# Patient Record
Sex: MALE | Race: WHITE | NOT HISPANIC OR LATINO
[De-identification: names, ages, dates, MRNs, and addresses within clinical notes are randomized per-mention and may not be internally consistent; named-entity substitution may affect disease eponyms.]

---

## 2020-12-28 ENCOUNTER — TRANSCRIPTION ENCOUNTER (OUTPATIENT)
Age: 65
End: 2020-12-28

## 2021-02-17 ENCOUNTER — TRANSCRIPTION ENCOUNTER (OUTPATIENT)
Age: 66
End: 2021-02-17

## 2024-08-03 ENCOUNTER — INPATIENT (INPATIENT)
Facility: HOSPITAL | Age: 69
LOS: 2 days | Discharge: HOME | DRG: 872 | End: 2024-08-06
Attending: STUDENT IN AN ORGANIZED HEALTH CARE EDUCATION/TRAINING PROGRAM | Admitting: HOSPITALIST
Payer: MEDICARE

## 2024-08-03 VITALS
TEMPERATURE: 100 F | WEIGHT: 177.91 LBS | OXYGEN SATURATION: 97 % | SYSTOLIC BLOOD PRESSURE: 123 MMHG | HEIGHT: 71 IN | HEART RATE: 91 BPM | DIASTOLIC BLOOD PRESSURE: 79 MMHG | RESPIRATION RATE: 20 BRPM

## 2024-08-03 DIAGNOSIS — R78.81 BACTEREMIA: ICD-10-CM

## 2024-08-03 DIAGNOSIS — Z98.89 OTHER SPECIFIED POSTPROCEDURAL STATES: Chronic | ICD-10-CM

## 2024-08-03 DIAGNOSIS — N30.00 ACUTE CYSTITIS WITHOUT HEMATURIA: ICD-10-CM

## 2024-08-03 DIAGNOSIS — T79.A29A TRAUMATIC COMPARTMENT SYNDROME OF UNSPECIFIED LOWER EXTREMITY, INITIAL ENCOUNTER: Chronic | ICD-10-CM

## 2024-08-03 DIAGNOSIS — E78.5 HYPERLIPIDEMIA, UNSPECIFIED: ICD-10-CM

## 2024-08-03 DIAGNOSIS — A41.51 SEPSIS DUE TO ESCHERICHIA COLI [E. COLI]: ICD-10-CM

## 2024-08-03 DIAGNOSIS — I10 ESSENTIAL (PRIMARY) HYPERTENSION: ICD-10-CM

## 2024-08-03 DIAGNOSIS — D69.6 THROMBOCYTOPENIA, UNSPECIFIED: ICD-10-CM

## 2024-08-03 DIAGNOSIS — Z29.9 ENCOUNTER FOR PROPHYLACTIC MEASURES, UNSPECIFIED: ICD-10-CM

## 2024-08-03 LAB
ALBUMIN SERPL ELPH-MCNC: 3.7 G/DL — SIGNIFICANT CHANGE UP (ref 3.3–5)
ALP SERPL-CCNC: 49 U/L — SIGNIFICANT CHANGE UP (ref 40–120)
ALT FLD-CCNC: 27 U/L — SIGNIFICANT CHANGE UP (ref 10–45)
ANION GAP SERPL CALC-SCNC: 13 MMOL/L — SIGNIFICANT CHANGE UP (ref 5–17)
APPEARANCE UR: ABNORMAL
APTT BLD: 29.1 SEC — SIGNIFICANT CHANGE UP (ref 24.5–35.6)
AST SERPL-CCNC: 32 U/L — SIGNIFICANT CHANGE UP (ref 10–40)
BACTERIA # UR AUTO: NEGATIVE /HPF — SIGNIFICANT CHANGE UP
BASE EXCESS BLDV CALC-SCNC: 1.7 MMOL/L — SIGNIFICANT CHANGE UP (ref -2–3)
BASOPHILS # BLD AUTO: 0.05 K/UL — SIGNIFICANT CHANGE UP (ref 0–0.2)
BASOPHILS NFR BLD AUTO: 0.3 % — SIGNIFICANT CHANGE UP (ref 0–2)
BILIRUB SERPL-MCNC: 1.1 MG/DL — SIGNIFICANT CHANGE UP (ref 0.2–1.2)
BILIRUB UR-MCNC: NEGATIVE — SIGNIFICANT CHANGE UP
BUN SERPL-MCNC: 22 MG/DL — SIGNIFICANT CHANGE UP (ref 7–23)
CA-I SERPL-SCNC: 1.15 MMOL/L — SIGNIFICANT CHANGE UP (ref 1.15–1.33)
CALCIUM SERPL-MCNC: 8.8 MG/DL — SIGNIFICANT CHANGE UP (ref 8.4–10.5)
CAST: 1 /LPF — SIGNIFICANT CHANGE UP (ref 0–4)
CHLORIDE BLDV-SCNC: 100 MMOL/L — SIGNIFICANT CHANGE UP (ref 96–108)
CHLORIDE SERPL-SCNC: 100 MMOL/L — SIGNIFICANT CHANGE UP (ref 96–108)
CO2 BLDV-SCNC: 29 MMOL/L — HIGH (ref 22–26)
CO2 SERPL-SCNC: 21 MMOL/L — LOW (ref 22–31)
COLOR SPEC: YELLOW — SIGNIFICANT CHANGE UP
CREAT SERPL-MCNC: 0.94 MG/DL — SIGNIFICANT CHANGE UP (ref 0.5–1.3)
DIFF PNL FLD: ABNORMAL
EGFR: 88 ML/MIN/1.73M2 — SIGNIFICANT CHANGE UP
EOSINOPHIL # BLD AUTO: 0.03 K/UL — SIGNIFICANT CHANGE UP (ref 0–0.5)
EOSINOPHIL NFR BLD AUTO: 0.2 % — SIGNIFICANT CHANGE UP (ref 0–6)
GAS PNL BLDV: 131 MMOL/L — LOW (ref 136–145)
GAS PNL BLDV: SIGNIFICANT CHANGE UP
GLUCOSE BLDV-MCNC: 119 MG/DL — HIGH (ref 70–99)
GLUCOSE SERPL-MCNC: 111 MG/DL — HIGH (ref 70–99)
GLUCOSE UR QL: NEGATIVE MG/DL — SIGNIFICANT CHANGE UP
HCO3 BLDV-SCNC: 28 MMOL/L — SIGNIFICANT CHANGE UP (ref 22–29)
HCT VFR BLD CALC: 39.4 % — SIGNIFICANT CHANGE UP (ref 39–50)
HCT VFR BLDA CALC: 40 % — SIGNIFICANT CHANGE UP (ref 39–51)
HGB BLD CALC-MCNC: 13.3 G/DL — SIGNIFICANT CHANGE UP (ref 12.6–17.4)
HGB BLD-MCNC: 13.4 G/DL — SIGNIFICANT CHANGE UP (ref 13–17)
IMM GRANULOCYTES NFR BLD AUTO: 0.7 % — SIGNIFICANT CHANGE UP (ref 0–0.9)
INR BLD: 1.61 RATIO — HIGH (ref 0.85–1.18)
KETONES UR-MCNC: 15 MG/DL
LACTATE BLDV-MCNC: 1.4 MMOL/L — SIGNIFICANT CHANGE UP (ref 0.5–2)
LACTATE SERPL-SCNC: 1 MMOL/L — SIGNIFICANT CHANGE UP (ref 0.5–2)
LEUKOCYTE ESTERASE UR-ACNC: ABNORMAL
LYMPHOCYTES # BLD AUTO: 0.85 K/UL — LOW (ref 1–3.3)
LYMPHOCYTES # BLD AUTO: 5.5 % — LOW (ref 13–44)
MCHC RBC-ENTMCNC: 30 PG — SIGNIFICANT CHANGE UP (ref 27–34)
MCHC RBC-ENTMCNC: 34 GM/DL — SIGNIFICANT CHANGE UP (ref 32–36)
MCV RBC AUTO: 88.1 FL — SIGNIFICANT CHANGE UP (ref 80–100)
MONOCYTES # BLD AUTO: 1.36 K/UL — HIGH (ref 0–0.9)
MONOCYTES NFR BLD AUTO: 8.8 % — SIGNIFICANT CHANGE UP (ref 2–14)
NEUTROPHILS # BLD AUTO: 13 K/UL — HIGH (ref 1.8–7.4)
NEUTROPHILS NFR BLD AUTO: 84.5 % — HIGH (ref 43–77)
NITRITE UR-MCNC: NEGATIVE — SIGNIFICANT CHANGE UP
NRBC # BLD: 0 /100 WBCS — SIGNIFICANT CHANGE UP (ref 0–0)
PCO2 BLDV: 48 MMHG — SIGNIFICANT CHANGE UP (ref 42–55)
PH BLDV: 7.37 — SIGNIFICANT CHANGE UP (ref 7.32–7.43)
PH UR: 5.5 — SIGNIFICANT CHANGE UP (ref 5–8)
PLATELET # BLD AUTO: 90 K/UL — LOW (ref 150–400)
PO2 BLDV: 22 MMHG — LOW (ref 25–45)
POTASSIUM BLDV-SCNC: 4 MMOL/L — SIGNIFICANT CHANGE UP (ref 3.5–5.1)
POTASSIUM SERPL-MCNC: 3.6 MMOL/L — SIGNIFICANT CHANGE UP (ref 3.5–5.3)
POTASSIUM SERPL-SCNC: 3.6 MMOL/L — SIGNIFICANT CHANGE UP (ref 3.5–5.3)
PROT SERPL-MCNC: 6 G/DL — SIGNIFICANT CHANGE UP (ref 6–8.3)
PROT UR-MCNC: 30 MG/DL
PROTHROM AB SERPL-ACNC: 16.7 SEC — HIGH (ref 9.5–13)
RBC # BLD: 4.47 M/UL — SIGNIFICANT CHANGE UP (ref 4.2–5.8)
RBC # FLD: 14 % — SIGNIFICANT CHANGE UP (ref 10.3–14.5)
RBC CASTS # UR COMP ASSIST: 3 /HPF — SIGNIFICANT CHANGE UP (ref 0–4)
REVIEW: SIGNIFICANT CHANGE UP
SAO2 % BLDV: 38.3 % — LOW (ref 67–88)
SODIUM SERPL-SCNC: 134 MMOL/L — LOW (ref 135–145)
SP GR SPEC: 1.02 — SIGNIFICANT CHANGE UP (ref 1–1.03)
SQUAMOUS # UR AUTO: 2 /HPF — SIGNIFICANT CHANGE UP (ref 0–5)
UROBILINOGEN FLD QL: 1 MG/DL — SIGNIFICANT CHANGE UP (ref 0.2–1)
WBC # BLD: 15.4 K/UL — HIGH (ref 3.8–10.5)
WBC # FLD AUTO: 15.4 K/UL — HIGH (ref 3.8–10.5)
WBC UR QL: 196 /HPF — HIGH (ref 0–5)

## 2024-08-03 PROCEDURE — 99223 1ST HOSP IP/OBS HIGH 75: CPT

## 2024-08-03 PROCEDURE — 99291 CRITICAL CARE FIRST HOUR: CPT

## 2024-08-03 PROCEDURE — 74177 CT ABD & PELVIS W/CONTRAST: CPT | Mod: 26

## 2024-08-03 PROCEDURE — 93010 ELECTROCARDIOGRAM REPORT: CPT

## 2024-08-03 RX ORDER — PIPERACILLIN SODIUM, TAZOBACTAM SODIUM 3; .375 G/15ML; G/15ML
3.38 INJECTION, POWDER, LYOPHILIZED, FOR SOLUTION INTRAVENOUS EVERY 8 HOURS
Refills: 0 | Status: DISCONTINUED | OUTPATIENT
Start: 2024-08-03 | End: 2024-08-04

## 2024-08-03 RX ORDER — ACETAMINOPHEN 500 MG
650 TABLET ORAL EVERY 6 HOURS
Refills: 0 | Status: DISCONTINUED | OUTPATIENT
Start: 2024-08-03 | End: 2024-08-06

## 2024-08-03 RX ORDER — MELATONIN 3 MG
3 TABLET ORAL AT BEDTIME
Refills: 0 | Status: DISCONTINUED | OUTPATIENT
Start: 2024-08-03 | End: 2024-08-06

## 2024-08-03 RX ORDER — CANDESARTAN CILEXETIL 4 MG/1
1 TABLET ORAL
Refills: 0 | DISCHARGE

## 2024-08-03 RX ORDER — DEXTROSE MONOHYDRATE, SODIUM CHLORIDE, SODIUM LACTATE, CALCIUM CHLORIDE, MAGNESIUM CHLORIDE 1.5; 538; 448; 18.4; 5.08 G/100ML; MG/100ML; MG/100ML; MG/100ML; MG/100ML
1000 SOLUTION INTRAPERITONEAL ONCE
Refills: 0 | Status: COMPLETED | OUTPATIENT
Start: 2024-08-03 | End: 2024-08-03

## 2024-08-03 RX ORDER — ACETAMINOPHEN 500 MG
1000 TABLET ORAL ONCE
Refills: 0 | Status: COMPLETED | OUTPATIENT
Start: 2024-08-03 | End: 2024-08-03

## 2024-08-03 RX ORDER — LEVOTHYROXINE SODIUM 175 MCG
1 TABLET ORAL
Refills: 0 | DISCHARGE

## 2024-08-03 RX ORDER — ATORVASTATIN CALCIUM 40 MG/1
80 TABLET, FILM COATED ORAL AT BEDTIME
Refills: 0 | Status: DISCONTINUED | OUTPATIENT
Start: 2024-08-03 | End: 2024-08-06

## 2024-08-03 RX ORDER — ROSUVASTATIN CALCIUM 10 MG
1 TABLET ORAL
Refills: 0 | DISCHARGE

## 2024-08-03 RX ORDER — ACETAMINOPHEN 500 MG
650 TABLET ORAL ONCE
Refills: 0 | Status: COMPLETED | OUTPATIENT
Start: 2024-08-03 | End: 2024-08-03

## 2024-08-03 RX ORDER — PIPERACILLIN SODIUM, TAZOBACTAM SODIUM 3; .375 G/15ML; G/15ML
3.38 INJECTION, POWDER, LYOPHILIZED, FOR SOLUTION INTRAVENOUS ONCE
Refills: 0 | Status: COMPLETED | OUTPATIENT
Start: 2024-08-03 | End: 2024-08-03

## 2024-08-03 RX ORDER — LEVOTHYROXINE SODIUM 175 MCG
100 TABLET ORAL DAILY
Refills: 0 | Status: DISCONTINUED | OUTPATIENT
Start: 2024-08-03 | End: 2024-08-06

## 2024-08-03 RX ADMIN — DEXTROSE MONOHYDRATE, SODIUM CHLORIDE, SODIUM LACTATE, CALCIUM CHLORIDE, MAGNESIUM CHLORIDE 1000 MILLILITER(S): 1.5; 538; 448; 18.4; 5.08 SOLUTION INTRAPERITONEAL at 21:14

## 2024-08-03 RX ADMIN — Medication 400 MILLIGRAM(S): at 06:06

## 2024-08-03 RX ADMIN — PIPERACILLIN SODIUM, TAZOBACTAM SODIUM 25 GRAM(S): 3; .375 INJECTION, POWDER, LYOPHILIZED, FOR SOLUTION INTRAVENOUS at 13:02

## 2024-08-03 RX ADMIN — Medication 100 MICROGRAM(S): at 18:33

## 2024-08-03 RX ADMIN — PIPERACILLIN SODIUM, TAZOBACTAM SODIUM 200 GRAM(S): 3; .375 INJECTION, POWDER, LYOPHILIZED, FOR SOLUTION INTRAVENOUS at 05:18

## 2024-08-03 RX ADMIN — DEXTROSE MONOHYDRATE, SODIUM CHLORIDE, SODIUM LACTATE, CALCIUM CHLORIDE, MAGNESIUM CHLORIDE 500 MILLILITER(S): 1.5; 538; 448; 18.4; 5.08 SOLUTION INTRAPERITONEAL at 10:09

## 2024-08-03 RX ADMIN — ATORVASTATIN CALCIUM 80 MILLIGRAM(S): 40 TABLET, FILM COATED ORAL at 22:36

## 2024-08-03 RX ADMIN — PIPERACILLIN SODIUM, TAZOBACTAM SODIUM 25 GRAM(S): 3; .375 INJECTION, POWDER, LYOPHILIZED, FOR SOLUTION INTRAVENOUS at 22:35

## 2024-08-03 RX ADMIN — Medication 650 MILLIGRAM(S): at 18:33

## 2024-08-03 NOTE — ED ADULT NURSE NOTE - NSFALLUNIVINTERV_ED_ALL_ED
Bed/Stretcher in lowest position, wheels locked, appropriate side rails in place/Call bell, personal items and telephone in reach/Instruct patient to call for assistance before getting out of bed/chair/stretcher/Non-slip footwear applied when patient is off stretcher/[place] to call system/Physically safe environment - no spills, clutter or unnecessary equipment/Purposeful proactive rounding/Room/bathroom lighting operational, light cord in reach

## 2024-08-03 NOTE — H&P ADULT - NSHPSOCIALHISTORY_GEN_ALL_CORE
Marital status: , wife was at bedside   Occupation:  of a [email or web address]   Tobacco Use: denies any tobacco use  Alcohol Use: denies etoh intake  Drug use: denies marijuana, cocaine, heroine and other illicit drug use   ETC - very active

## 2024-08-03 NOTE — ED ADULT NURSE NOTE - NSICDXPASTSURGICALHX_GEN_ALL_CORE_FT
PAST SURGICAL HISTORY:  Compartment syndrome of lower extremity     H/O cardiac radiofrequency ablation     H/O knee surgery     H/O shoulder surgery

## 2024-08-03 NOTE — H&P ADULT - NSHPLABSRESULTS_GEN_ALL_CORE
EKG personally reviewed: NSR, HR  75, qtc 412 no st elevation or depression. no pvcs.   CXR personally reviewed: no focal consolidation, no pleff, clear lungs  Labs below are personally reviewed:

## 2024-08-03 NOTE — H&P ADULT - PROBLEM SELECTOR PLAN 2
plt initially 140 and then dropped to 90, unclear origin, but may be related to thrombocytopenia of sepsis  - if continues to drop, will expand workup  - hold chemical ppx, start SCD at this time

## 2024-08-03 NOTE — H&P ADULT - HISTORY OF PRESENT ILLNESS
Patient is a 68 year old male with history of HTN, HLD and Hypothyroid who presents after being called back regarding bacteremia. 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 States that on Thursday 8/1, he woke up drenched in sweat, felt very chilly and started shaking in rigors. States he also felt dizzy at that time. States that when he checked, he had fever of 102F. He was very weak and tired and almost fainted and so family called 911. That day he also had decreased urine output but denies any burning with urination, suprapubic pain, lower back pain. Otherwise has not had any fevers before that day. States that he otherwise did not have any abdominal pain, any right upper quadrant pain.     In the original ED, he had fever of 102.9, elevated lactate, found to have UTI and sent home on oral abx. However he was called back one day later due to positive blood cultures.

## 2024-08-03 NOTE — ED PROVIDER NOTE - ATTENDING CONTRIBUTION TO CARE
67 yo male otherwise in good health recently presented for fever, chills, malaise, decreased urinary output.  had UA c/w UTI, d/c'ed on antibiotics.  blood cultures now resulted positive for e.coli bacteremia.  family @ bedside for collateral.     CBC, CMP sent, leukocytosis noted.  thrombocytopenia of unclear etiology and mild elevation of INR.  lactate wnl.  repeat blood cultures drawn, urinalysis and urine cultures sent.  broad spectrum antibiotics, admit for bacteremia, continued IV antibiotics, ID consult, further inpatient management.

## 2024-08-03 NOTE — H&P ADULT - ASSESSMENT
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 Coli Bacteremia likely from UTI

## 2024-08-03 NOTE — H&P ADULT - NSHPREVIEWOFSYSTEMS_GEN_ALL_CORE
CONSTITUTIONAL/GENERAL: +weakness, fevers and chills  EYES/OPHTHALMOLOGIC: No visual changes, No blurry vision, No vertigo   ENMT: No throat pain, or neck stiffness   RESPIRATORY/THORAX: No cough, wheezing, hemoptysis; No shortness of breath  CARDIOVASCULAR: No chest pain or palpitations  GASTROINTESTINAL: No abdominal or epigastric pain. No nausea, vomiting, or hematemesis; No diarrhea or constipation. No melena or hematochezia.  GENITOURINARY: No dysuria, frequency or hematuria. decreased urinary frequency on thursday but now improved   NEUROLOGICAL: No numbness or weakness  SKIN: No itching, rashes  ENDOCRINOLOGY: no heat intolerance, no cold intolerance, no weight gain, no weight loss  PSYCHIATRIC:  no si/no hi, mood stable, no anxiety  MUSCULOSKELETAL SYSTEM:  no joint pain, no myalgias, no arthralgias, no joint swelling

## 2024-08-03 NOTE — H&P ADULT - PROBLEM SELECTOR PLAN 1
wbc 15, fever to 102.9, and source is UTI and E. Coli bacteremia  - okay to c/w Zosyn, f/u repeat bcx, f/u UCx  - source of e. coli is most likely urine, but given initial elevated billirubin, elevated INR, wonder if there is a component of intrabdominal/liver illness - will order RUQUS and CT A/P  - hold antihypertensives

## 2024-08-03 NOTE — ED PROVIDER NOTE - CLINICAL SUMMARY MEDICAL DECISION MAKING FREE TEXT BOX
68-year-old male history of hypothyroidism, hypertension presenting for abnormal/positive blood cultures the patient was seen yesterday because he was having fevers and diaphoresis in the night.  The patient was diagnosed with a urinary tract infection at that time.  The patient was given antibiotics and sent home.  The patient blood culture however the root E. coli/ gram negative rods and he was told to come back.  The patient states that he is still having fevers but is not having any abdominal pain, nausea, vomiting, headaches or visual changes.  ROS is otherwise negative.    VS. patient borderline febrile orally.  PE.  No acute findings.  Will do septic workup, give empiric antibiotics, and admit the patient for bacteremia.  Patient otherwise has no new complaints. 68-year-old male history of hypothyroidism, hypertension presenting for abnormal/positive blood cultures the patient was seen yesterday because he was having fevers and diaphoresis in the night.  The patient was diagnosed with a urinary tract infection at that time.  The patient was given antibiotics and sent home.  The patient blood culture however the root E. coli/ gram negative rods and he was told to come back.  The patient states that he is still having fevers but is not having any abdominal pain, nausea, vomiting, headaches or visual changes.  ROS is otherwise negative.    VS. patient borderline febrile orally.  PE.  No acute findings.  Will do septic workup, give empiric antibiotics, and admit the patient for bacteremia.  Patient otherwise has no new complaints.    see attending attestation authored by me, Renato Kam MD, for further MDM details.

## 2024-08-03 NOTE — H&P ADULT - PROBLEM SELECTOR PLAN 4
Aliya-Care Coordinator with [facility] called to make sure we received the request below. Aliya is aware message has  Been forwarded to Dr. Gannon. Pt is scheduled for a hospital follow up with Dr. Grace on 6/12. Pt is on a ventilator and   Care coordination wants to make sure that Dr. Grace sees Pt's that are vented.  Aliya can be reached at [phone] today.  Mauricio can be reached 6/05 at *[phone]   holding home candesartan 16mg qD

## 2024-08-03 NOTE — H&P ADULT - PROBLEM SELECTOR PLAN 3
UA + for leuk esterase and nitrates w/ wbcs   -started on Zosyn (8/3- )  -f/u UCx, de-escalate as appropriate

## 2024-08-03 NOTE — ED PROVIDER NOTE - INTERPRETATION
EKG independently reviewed for rate, rhythm, axis, intervals and segments, including QRS morphology, P wave appearance T wave appearance, NM interval, and QT interval.  I find the EKG to be notable as follows: PACs

## 2024-08-03 NOTE — ED ADULT NURSE NOTE - OBJECTIVE STATEMENT
67 y/o M with  Hx HTN, HLD and hypothyroid presenting to ED after being  called to come in by hospital for positive cultures.  pt was recently seen yesterday for fever was DC home on oral antibiotics. Pt states. Upon assessment pt is  A&Ox4 speaking coherently in full sentences. Pt is breathing unlabored and equal BL in no apparent distress, radial pulses are 2+ BL. Denies HA, dizziness, blurry vision. Denies SOB, dyspnea, cough, CP, palpitations. EKG done. On CM, NSR. Denies abd pain, N/V/D/C. Abd soft NT/ND. Denies urinary symptoms. Denies fever, chills. No sick contacts. Skin intact, warm, dry, normal for race.

## 2024-08-03 NOTE — H&P ADULT - NSHPPHYSICALEXAM_GEN_ALL_CORE
GENERAL: NAD, well-developed, somewhat fatigued  HEAD:  Atraumatic, Normocephalic  EYES: EOMI, PERRLA, conjunctiva and sclera clear  NECK: Supple, No JVD  CHEST/LUNG: Clear to auscultation bilaterally; No wheeze  HEART: Regular rate and rhythm; No murmurs, rubs, or gallops  ABDOMEN: Soft, Nontender, Nondistended; Bowel sounds present  EXTREMITIES:  2+ Peripheral Pulses, No clubbing, cyanosis, or edema  PSYCH: AAOx3, appropriate affect  NEUROLOGY: non-focal, [place]dwell  SKIN: No rashes or lesions

## 2024-08-04 LAB
ALBUMIN SERPL ELPH-MCNC: 3.2 G/DL — LOW (ref 3.3–5)
ALP SERPL-CCNC: 54 U/L — SIGNIFICANT CHANGE UP (ref 40–120)
ALT FLD-CCNC: 28 U/L — SIGNIFICANT CHANGE UP (ref 10–45)
ANION GAP SERPL CALC-SCNC: 10 MMOL/L — SIGNIFICANT CHANGE UP (ref 5–17)
AST SERPL-CCNC: 29 U/L — SIGNIFICANT CHANGE UP (ref 10–40)
BASOPHILS # BLD AUTO: 0.03 K/UL — SIGNIFICANT CHANGE UP (ref 0–0.2)
BASOPHILS NFR BLD AUTO: 0.4 % — SIGNIFICANT CHANGE UP (ref 0–2)
BILIRUB SERPL-MCNC: 0.8 MG/DL — SIGNIFICANT CHANGE UP (ref 0.2–1.2)
BUN SERPL-MCNC: 15 MG/DL — SIGNIFICANT CHANGE UP (ref 7–23)
CALCIUM SERPL-MCNC: 8.8 MG/DL — SIGNIFICANT CHANGE UP (ref 8.4–10.5)
CHLORIDE SERPL-SCNC: 102 MMOL/L — SIGNIFICANT CHANGE UP (ref 96–108)
CO2 SERPL-SCNC: 23 MMOL/L — SIGNIFICANT CHANGE UP (ref 22–31)
CREAT SERPL-MCNC: 0.93 MG/DL — SIGNIFICANT CHANGE UP (ref 0.5–1.3)
CULTURE RESULTS: SIGNIFICANT CHANGE UP
EGFR: 89 ML/MIN/1.73M2 — SIGNIFICANT CHANGE UP
EOSINOPHIL # BLD AUTO: 0.1 K/UL — SIGNIFICANT CHANGE UP (ref 0–0.5)
EOSINOPHIL NFR BLD AUTO: 1.4 % — SIGNIFICANT CHANGE UP (ref 0–6)
GLUCOSE BLDC GLUCOMTR-MCNC: 174 MG/DL — HIGH (ref 70–99)
GLUCOSE SERPL-MCNC: 92 MG/DL — SIGNIFICANT CHANGE UP (ref 70–99)
HCT VFR BLD CALC: 39 % — SIGNIFICANT CHANGE UP (ref 39–50)
HGB BLD-MCNC: 13.4 G/DL — SIGNIFICANT CHANGE UP (ref 13–17)
IMM GRANULOCYTES NFR BLD AUTO: 0.3 % — SIGNIFICANT CHANGE UP (ref 0–0.9)
LYMPHOCYTES # BLD AUTO: 0.54 K/UL — LOW (ref 1–3.3)
LYMPHOCYTES # BLD AUTO: 7.4 % — LOW (ref 13–44)
MAGNESIUM SERPL-MCNC: 1.8 MG/DL — SIGNIFICANT CHANGE UP (ref 1.6–2.6)
MCHC RBC-ENTMCNC: 30.4 PG — SIGNIFICANT CHANGE UP (ref 27–34)
MCHC RBC-ENTMCNC: 34.4 GM/DL — SIGNIFICANT CHANGE UP (ref 32–36)
MCV RBC AUTO: 88.4 FL — SIGNIFICANT CHANGE UP (ref 80–100)
MONOCYTES # BLD AUTO: 0.53 K/UL — SIGNIFICANT CHANGE UP (ref 0–0.9)
MONOCYTES NFR BLD AUTO: 7.3 % — SIGNIFICANT CHANGE UP (ref 2–14)
NEUTROPHILS # BLD AUTO: 6.04 K/UL — SIGNIFICANT CHANGE UP (ref 1.8–7.4)
NEUTROPHILS NFR BLD AUTO: 83.2 % — HIGH (ref 43–77)
NRBC # BLD: 0 /100 WBCS — SIGNIFICANT CHANGE UP (ref 0–0)
PHOSPHATE SERPL-MCNC: 1.8 MG/DL — LOW (ref 2.5–4.5)
PLATELET # BLD AUTO: 76 K/UL — LOW (ref 150–400)
POTASSIUM SERPL-MCNC: 4.1 MMOL/L — SIGNIFICANT CHANGE UP (ref 3.5–5.3)
POTASSIUM SERPL-SCNC: 4.1 MMOL/L — SIGNIFICANT CHANGE UP (ref 3.5–5.3)
PROT SERPL-MCNC: 5.8 G/DL — LOW (ref 6–8.3)
RBC # BLD: 4.41 M/UL — SIGNIFICANT CHANGE UP (ref 4.2–5.8)
RBC # FLD: 14 % — SIGNIFICANT CHANGE UP (ref 10.3–14.5)
SODIUM SERPL-SCNC: 135 MMOL/L — SIGNIFICANT CHANGE UP (ref 135–145)
SPECIMEN SOURCE: SIGNIFICANT CHANGE UP
WBC # BLD: 7.26 K/UL — SIGNIFICANT CHANGE UP (ref 3.8–10.5)
WBC # FLD AUTO: 7.26 K/UL — SIGNIFICANT CHANGE UP (ref 3.8–10.5)

## 2024-08-04 PROCEDURE — 99222 1ST HOSP IP/OBS MODERATE 55: CPT | Mod: GC

## 2024-08-04 PROCEDURE — 76705 ECHO EXAM OF ABDOMEN: CPT | Mod: 26

## 2024-08-04 PROCEDURE — 99233 SBSQ HOSP IP/OBS HIGH 50: CPT

## 2024-08-04 RX ADMIN — Medication 650 MILLIGRAM(S): at 17:49

## 2024-08-04 RX ADMIN — Medication 650 MILLIGRAM(S): at 16:22

## 2024-08-04 RX ADMIN — Medication 100 MICROGRAM(S): at 05:39

## 2024-08-04 RX ADMIN — PIPERACILLIN SODIUM, TAZOBACTAM SODIUM 25 GRAM(S): 3; .375 INJECTION, POWDER, LYOPHILIZED, FOR SOLUTION INTRAVENOUS at 13:56

## 2024-08-04 RX ADMIN — PIPERACILLIN SODIUM, TAZOBACTAM SODIUM 25 GRAM(S): 3; .375 INJECTION, POWDER, LYOPHILIZED, FOR SOLUTION INTRAVENOUS at 05:39

## 2024-08-04 RX ADMIN — Medication 100 MILLIGRAM(S): at 15:56

## 2024-08-04 NOTE — PROGRESS NOTE ADULT - TIME BILLING
Reviewing previous chart including notes, imaging, labs  Obtain history and physical exam from patient  Coordination with consultants  Discussing plan of care with patient and family

## 2024-08-04 NOTE — CONSULT NOTE ADULT - ASSESSMENT
69 yo M with PMH of hypothyroidism, and HTN who presented on 8/2 for fever, sweating, chills, fatigue, discharged from ED, called back to hospital for E. Coli bacteremia.    Patient had episode of malaise, fever, chills, shaking, diaphoresis upon awakening.  Tmax 100.1, resolved leukocytosis  Blood cultures with E. coli, pan-sensitive  Repeat blood cultures now without growth after Abx  Urine cultures also with >100K E. Coli with pyuria  CT a/p is unremarkable    Abx:  Zosyn 8/3 -->    #E coli bacteremia 2/2 urinary source    - would dc zosyn and start IV ceftriaxone 1g q24h while inpatient  - when ready for discharge switch to PO ciprofloxacin 500 mg q12h through 8/12    d/w attending    Shar Yates, PGY5  ID Fellow  [employer] Preferred  After 5pm/weekends call [phone]

## 2024-08-04 NOTE — PHYSICAL THERAPY INITIAL EVALUATION ADULT - ADDITIONAL COMMENTS
Pt resides with his wife in an apartment with elevator access. PTA, pt was independent with all mobility/ADLs. Pt was training for an ironman race (swimming, biking, running).

## 2024-08-04 NOTE — CONSULT NOTE ADULT - ATTENDING COMMENTS
68 m with HTN, hypothyroidism,  presented on 8/2 for fever, sweating, chills, fatigue, discharged from ED with cefpodoxime but blood cx came back positive with pan S E-coli so pt was called back   tmax: 100.1    no WBC   urine cx with E-coli as well  CT negative    fever, rigors due to E-coli bacteremia, no urinary symptoms but also in the urine and abd CT negative    * on zosyn since 8/3, afebrile and asymptomatic  * switch to PO cipro 500 bid to complete a 10 day course  * please call with questions    The above assessment and plan was discussed with the primary team    Juliana Mendoza MD  contact on teams  After 5pm and on weekends call [phone]

## 2024-08-04 NOTE — CONSULT NOTE ADULT - SUBJECTIVE AND OBJECTIVE BOX
Patient is a 68y old  Male who presents with a chief complaint of bacteremia (04 Aug 2024 08:15)    HPI:  Patient is a 68 year old male with history of HTN, HLD and Hypothyroid who presents after being called back regarding bacteremia. 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 States that on Thursday 8/1, he woke up drenched in sweat, felt very chilly and started shaking in rigors. States he also felt dizzy at that time. States that when he checked, he had fever of 102F. He was very weak and tired and almost fainted and so family called 911. That day he also had decreased urine output but denies any burning with urination, suprapubic pain, lower back pain. Otherwise has not had any fevers before that day. States that he otherwise did not have any abdominal pain, any right upper quadrant pain.     In the original ED, he had fever of 102.9, elevated lactate, found to have UTI and sent home on oral abx. However he was called back one day later due to positive blood cultures.  (03 Aug 2024 15:33)       REVIEW OF SYSTEMS  Constitutional: No fevers, chills, weight loss or fatigue   Skin: No rash, no phlebitis	  Eyes: No discharge	  ENMT: No sore throat, oral thrush, ulcers or exudate  Respiratory: No cough, no SOB  Cardiovascular:  No chest pain, palpitations or edema   Gastrointestinal: No pain, nausea, vomiting, diarrhea or constipation	  Genitourinary: No dysuria, discharge or flank pain  MSK: No arthralgias or back pain   Neurological: No HA, no weakness, no seizures, no AMS       prior hospital charts reviewed [V]  primary team notes reviewed [V]  other consultant notes reviewed [V]    PAST MEDICAL & SURGICAL HISTORY:  Hypothyroid      Hypertension      SVT (supraventricular tachycardia)      H/O shoulder surgery      Compartment syndrome of lower extremity      H/O knee surgery      H/O cardiac radiofrequency ablation    SOCIAL HISTORY:  - Denied smoking/vaping/alcohol/recreational drug use    FAMILY HISTORY:  No pertinent family history in first degree relatives    Allergies  No Known Allergies    ANTIMICROBIALS:  piperacillin/tazobactam IVPB.. 3.375 every 8 hours      ANTIMICROBIALS (past 90 days):  MEDICATIONS  (STANDING):    piperacillin/tazobactam IVPB.-   25 mL/Hr IV Intermittent (08-03-24 @ 13:02)    piperacillin/tazobactam IVPB..   25 mL/Hr IV Intermittent (08-04-24 @ 05:39)   25 mL/Hr IV Intermittent (08-03-24 @ 22:35)    piperacillin/tazobactam IVPB...   200 mL/Hr IV Intermittent (08-03-24 @ 05:18)        OTHER MEDS:   MEDICATIONS  (STANDING):  acetaminophen     Tablet .. 650 every 6 hours PRN  atorvastatin 80 at bedtime  levothyroxine 100 daily  melatonin 3 at bedtime PRN      VITALS:  Vital Signs Last 24 Hrs  T(F): 99.4 (08-04-24 @ 12:40), Max: 102.9 (08-02-24 @ 05:43)    Vital Signs Last 24 Hrs  HR: 74 (08-04-24 @ 12:40) (62 - 77)  BP: 119/68 (08-04-24 @ 12:40) (105/56 - 148/88)  RR: 18 (08-04-24 @ 12:40)  SpO2: 96% (08-04-24 @ 12:40) (95% - 97%)  Wt(kg): --    EXAM:  General: Patient in no acute distress  HEENT: NCAT, no oral lesions  CV: S1+S2, no m/r/g appreciated   Lungs: No respiratory distress, CTAB  Abd: Soft, nontender, no guarding, no CVA tenderness  Ext: No cyanosis, no edema  Neuro: Alert and oriented  Skin: No rash   IV: No phlebitis      Labs:                        13.4   7.26  )-----------( 76       ( 04 Aug 2024 06:50 )             39.0     08-04    135  |  102  |  15  ----------------------------<  92  4.1   |  23  |  0.93    Ca    8.8      04 Aug 2024 06:50  Phos  1.8     08-04  Mg     1.8     08-04    TPro  5.8<L>  /  Alb  3.2<L>  /  TBili  0.8  /  DBili  x   /  AST  29  /  ALT  28  /  AlkPhos  54  08-04      WBC Trend:  WBC Count: 7.26 (08-04-24 @ 06:50)  WBC Count: 15.40 (08-03-24 @ 05:22)  WBC Count: 5.98 (08-02-24 @ 06:29)      Auto Neutrophil #: 6.04 K/uL (08-04-24 @ 06:50)  Auto Neutrophil #: 13.00 K/uL (08-03-24 @ 05:22)  Auto Neutrophil #: 5.17 K/uL (08-02-24 @ 06:29)  Band Neutrophils %: 8.1 % (08-02-24 @ 06:29)      Creatine Trend:  Creatinine: 0.93 (08-04)  Creatinine: 0.94 (08-03)  Creatinine: 1.11 (08-02)      Liver Biochemical Testing Trend:  Alanine Aminotransferase (ALT/SGPT): 28 (08-04)  Alanine Aminotransferase (ALT/SGPT): 27 (08-03)  Alanine Aminotransferase (ALT/SGPT): 30 (08-02)  Aspartate Aminotransferase (AST/SGOT): 29 (08-04-24 @ 06:50)  Aspartate Aminotransferase (AST/SGOT): 32 (08-03-24 @ 05:22)  Aspartate Aminotransferase (AST/SGOT): 36 (08-02-24 @ 06:29)  Bilirubin Total: 0.8 (08-04)  Bilirubin Total: 1.1 (08-03)  Bilirubin Total: 2.0 (08-02)    Auto Eosinophil %: 1.4 % (08-04-24 @ 06:50)  Auto Eosinophil %: 0.2 % (08-03-24 @ 05:22)  Auto Eosinophil %: 0.0 % (08-02-24 @ 06:29)      Urinalysis Basic - ( 04 Aug 2024 06:50 )    Color: x / Appearance: x / SG: x / pH: x  Gluc: 92 mg/dL / Ketone: x  / Bili: x / Urobili: x   Blood: x / Protein: x / Nitrite: x   Leuk Esterase: x / RBC: x / WBC x   Sq Epi: x / Non Sq Epi: x / Bacteria: x      MICROBIOLOGY:    Culture - Blood (collected 03 Aug 2024 05:12)  Source: .Blood Blood-Peripheral  Preliminary Report:    No growth at 24 hours    Culture - Blood (collected 03 Aug 2024 04:55)  Source: .Blood Blood-Peripheral  Preliminary Report:    No growth at 24 hours    Culture - Urine (collected 02 Aug 2024 12:25)  Source: Clean Catch Clean Catch (Midstream)  Preliminary Report:    >100,000 CFU/ml Escherichia coli    Culture - Blood (collected 02 Aug 2024 05:56)  Source: .Blood Blood-Peripheral  Final Report:    Growth in aerobic and anaerobic bottles: Escherichia coli    See previous culture [id number]    Culture - Blood (collected 02 Aug 2024 05:50)  Source: .Blood Blood-Peripheral  Final Report:    Growth in aerobic and anaerobic bottles: Escherichia coli    Direct identification is available within approximately 3-5    hours either by Blood Panel Multiplexed PCR or Direct    MALDI-TOF. Details: [email or web address]  Organism: Blood Culture PCR  Escherichia coli  Organism: Escherichia coli    Sensitivities:      Method Type: RIANNA      -  Ampicillin: S <=8 These ampicillin results predict results for amoxicillin      -  Ampicillin/Sulbactam: S <=4/2      -  Aztreonam: S <=4      -  Cefazolin: S <=2      -  Cefepime: S <=2      -  Cefoxitin: S <=8      -  Ceftriaxone: S <=1      -  Ciprofloxacin: S <=0.25      -  Ertapenem: S <=0.5      -  Gentamicin: S <=2      -  Imipenem: S <=1      -  Levofloxacin: S <=0.5      -  Meropenem: S <=1      -  Piperacillin/Tazobactam: S <=8      -  Tobramycin: S <=2      -  Trimethoprim/Sulfamethoxazole: S <=0.5/9.5  Organism: Blood Culture PCR    Sensitivities:      Method Type: PCR      -  Escherichia coli: Detec    Blood Gas Venous - Lactate: 1.4 (08-03 @ 15:10)  Lactate, Blood: 1.0 (08-03 @ 05:22)  Lactate, Blood: 1.5 (08-02 @ 09:39)  Blood Gas Venous - Lactate: 2.4 (08-02 @ 05:57)    RADIOLOGY:  imaging below personally reviewed    < from: CT Abdomen and Pelvis w/ IV Cont (08.03.24 @ 19:41) >  FINDINGS:  LOWER CHEST: Mild bibasilar dependent atelectasis.    LIVER: Scattered subcentimeter hypodensities too small to characterize.  BILE DUCTS: Normal caliber.  GALLBLADDER: Within normal limits.  SPLEEN: Within normal limits.  PANCREAS: Within normal limits.  ADRENALS: Within normal limits.  KIDNEYS/URETERS: No hydronephrosis. Right renal cyst and bilateral   subcentimeter hypodensities too small to characterize.    BLADDER: Within normal limits.  REPRODUCTIVE ORGANS: Prostate is enlarged.    BOWEL: No bowel obstruction. Appendix is normal.  PERITONEUM/RETROPERITONEUM: Within normal limits.  VESSELS: Atherosclerotic changes.  LYMPH NODES: No lymphadenopathy.  ABDOMINAL WALL: Small fat-containing right inguinal hernia.  BONES: Degenerative changes. Grade 1 anterolisthesis of L4 on L5.    IMPRESSION:  No evidence of acute abnormality in the abdomen and pelvis.    < end of copied text >   Patient is a 68y old  Male who presents with a chief complaint of bacteremia (04 Aug 2024 08:15)    HPI:  Patient is a 68 year old male with history of HTN, HLD and Hypothyroid who presents after being called back regarding bacteremia. 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 States that on Thursday 8/1, he woke up drenched in sweat, felt very chilly and started shaking in rigors. States he also felt dizzy at that time. States that when he checked, he had fever of 102F. He was very weak and tired and almost fainted and so family called 911. That day he also had decreased urine output but denies any burning with urination, suprapubic pain, lower back pain. Otherwise has not had any fevers before that day. States that he otherwise did not have any abdominal pain, any right upper quadrant pain.     In the original ED, he had fever of 102.9, elevated lactate, found to have UTI and sent home on oral abx. However he was called back one day later due to positive blood cultures.  (03 Aug 2024 15:33)       REVIEW OF SYSTEMS  Constitutional: No fevers, chills, weight loss or fatigue   Skin: No rash, no phlebitis	  Eyes: No discharge	  ENMT: No sore throat, oral thrush, ulcers or exudate  Respiratory: No cough, no SOB  Cardiovascular:  No chest pain, palpitations or edema   Gastrointestinal: No pain, nausea, vomiting, diarrhea or constipation	  Genitourinary: No dysuria, discharge or flank pain  MSK: No arthralgias or back pain   Neurological: No HA, no weakness, no seizures, no AMS   psych: no anxiety    prior hospital charts reviewed [V]  primary team notes reviewed [V]  other consultant notes reviewed [V]    PAST MEDICAL & SURGICAL HISTORY:  Hypothyroid      Hypertension      SVT (supraventricular tachycardia)      H/O shoulder surgery      Compartment syndrome of lower extremity      H/O knee surgery      H/O cardiac radiofrequency ablation    SOCIAL HISTORY:  , lives with wife  no smoking/vaping/alcohol/recreational drug use    FAMILY HISTORY:  No recent febrile illness in family members    Allergies  No Known Allergies    ANTIMICROBIALS:  piperacillin/tazobactam IVPB.. 3.375 every 8 hours      ANTIMICROBIALS (past 90 days):  MEDICATIONS  (STANDING):    piperacillin/tazobactam IVPB.-   25 mL/Hr IV Intermittent (08-03-24 @ 13:02)    piperacillin/tazobactam IVPB..   25 mL/Hr IV Intermittent (08-04-24 @ 05:39)   25 mL/Hr IV Intermittent (08-03-24 @ 22:35)    piperacillin/tazobactam IVPB...   200 mL/Hr IV Intermittent (08-03-24 @ 05:18)        OTHER MEDS:   MEDICATIONS  (STANDING):  acetaminophen     Tablet .. 650 every 6 hours PRN  atorvastatin 80 at bedtime  levothyroxine 100 daily  melatonin 3 at bedtime PRN      VITALS:  Vital Signs Last 24 Hrs  T(F): 99.4 (08-04-24 @ 12:40), Max: 102.9 (08-02-24 @ 05:43)    Vital Signs Last 24 Hrs  HR: 74 (08-04-24 @ 12:40) (62 - 77)  BP: 119/68 (08-04-24 @ 12:40) (105/56 - 148/88)  RR: 18 (08-04-24 @ 12:40)  SpO2: 96% (08-04-24 @ 12:40) (95% - 97%)  Wt(kg): --    EXAM:  General: Patient in no acute distress  HEENT: NCAT, no oral lesions  CV: S1+S2, no m/r/g appreciated   Lungs: No respiratory distress, CTAB  Abd: Soft, nontender, no guarding, no CVA tenderness  Ext: No cyanosis, no edema  Neuro: Alert and oriented  Skin: No rash   IV: No phlebitis      Labs:                        13.4   7.26  )-----------( 76       ( 04 Aug 2024 06:50 )             39.0     08-04    135  |  102  |  15  ----------------------------<  92  4.1   |  23  |  0.93    Ca    8.8      04 Aug 2024 06:50  Phos  1.8     08-04  Mg     1.8     08-04    TPro  5.8<L>  /  Alb  3.2<L>  /  TBili  0.8  /  DBili  x   /  AST  29  /  ALT  28  /  AlkPhos  54  08-04      WBC Trend:  WBC Count: 7.26 (08-04-24 @ 06:50)  WBC Count: 15.40 (08-03-24 @ 05:22)  WBC Count: 5.98 (08-02-24 @ 06:29)      Auto Neutrophil #: 6.04 K/uL (08-04-24 @ 06:50)  Auto Neutrophil #: 13.00 K/uL (08-03-24 @ 05:22)  Auto Neutrophil #: 5.17 K/uL (08-02-24 @ 06:29)  Band Neutrophils %: 8.1 % (08-02-24 @ 06:29)      Creatine Trend:  Creatinine: 0.93 (08-04)  Creatinine: 0.94 (08-03)  Creatinine: 1.11 (08-02)      Liver Biochemical Testing Trend:  Alanine Aminotransferase (ALT/SGPT): 28 (08-04)  Alanine Aminotransferase (ALT/SGPT): 27 (08-03)  Alanine Aminotransferase (ALT/SGPT): 30 (08-02)  Aspartate Aminotransferase (AST/SGOT): 29 (08-04-24 @ 06:50)  Aspartate Aminotransferase (AST/SGOT): 32 (08-03-24 @ 05:22)  Aspartate Aminotransferase (AST/SGOT): 36 (08-02-24 @ 06:29)  Bilirubin Total: 0.8 (08-04)  Bilirubin Total: 1.1 (08-03)  Bilirubin Total: 2.0 (08-02)    Auto Eosinophil %: 1.4 % (08-04-24 @ 06:50)  Auto Eosinophil %: 0.2 % (08-03-24 @ 05:22)  Auto Eosinophil %: 0.0 % (08-02-24 @ 06:29)      Urinalysis Basic - ( 04 Aug 2024 06:50 )    Color: x / Appearance: x / SG: x / pH: x  Gluc: 92 mg/dL / Ketone: x  / Bili: x / Urobili: x   Blood: x / Protein: x / Nitrite: x   Leuk Esterase: x / RBC: x / WBC x   Sq Epi: x / Non Sq Epi: x / Bacteria: x      MICROBIOLOGY:    Culture - Blood (collected 03 Aug 2024 05:12)  Source: .Blood Blood-Peripheral  Preliminary Report:    No growth at 24 hours    Culture - Blood (collected 03 Aug 2024 04:55)  Source: .Blood Blood-Peripheral  Preliminary Report:    No growth at 24 hours    Culture - Urine (collected 02 Aug 2024 12:25)  Source: Clean Catch Clean Catch (Midstream)  Preliminary Report:    >100,000 CFU/ml Escherichia coli    Culture - Blood (collected 02 Aug 2024 05:56)  Source: .Blood Blood-Peripheral  Final Report:    Growth in aerobic and anaerobic bottles: Escherichia coli    See previous culture [id number]    Culture - Blood (collected 02 Aug 2024 05:50)  Source: .Blood Blood-Peripheral  Final Report:    Growth in aerobic and anaerobic bottles: Escherichia coli    Direct identification is available within approximately 3-5    hours either by Blood Panel Multiplexed PCR or Direct    MALDI-TOF. Details: [email or web address]  Organism: Blood Culture PCR  Escherichia coli  Organism: Escherichia coli    Sensitivities:      Method Type: RIANNA      -  Ampicillin: S <=8 These ampicillin results predict results for amoxicillin      -  Ampicillin/Sulbactam: S <=4/2      -  Aztreonam: S <=4      -  Cefazolin: S <=2      -  Cefepime: S <=2      -  Cefoxitin: S <=8      -  Ceftriaxone: S <=1      -  Ciprofloxacin: S <=0.25      -  Ertapenem: S <=0.5      -  Gentamicin: S <=2      -  Imipenem: S <=1      -  Levofloxacin: S <=0.5      -  Meropenem: S <=1      -  Piperacillin/Tazobactam: S <=8      -  Tobramycin: S <=2      -  Trimethoprim/Sulfamethoxazole: S <=0.5/9.5  Organism: Blood Culture PCR    Sensitivities:      Method Type: PCR      -  Escherichia coli: Detec    Blood Gas Venous - Lactate: 1.4 (08-03 @ 15:10)  Lactate, Blood: 1.0 (08-03 @ 05:22)  Lactate, Blood: 1.5 (08-02 @ 09:39)  Blood Gas Venous - Lactate: 2.4 (08-02 @ 05:57)    RADIOLOGY:  imaging below personally reviewed    < from: CT Abdomen and Pelvis w/ IV Cont (08.03.24 @ 19:41) >  FINDINGS:  LOWER CHEST: Mild bibasilar dependent atelectasis.    LIVER: Scattered subcentimeter hypodensities too small to characterize.  BILE DUCTS: Normal caliber.  GALLBLADDER: Within normal limits.  SPLEEN: Within normal limits.  PANCREAS: Within normal limits.  ADRENALS: Within normal limits.  KIDNEYS/URETERS: No hydronephrosis. Right renal cyst and bilateral   subcentimeter hypodensities too small to characterize.    BLADDER: Within normal limits.  REPRODUCTIVE ORGANS: Prostate is enlarged.    BOWEL: No bowel obstruction. Appendix is normal.  PERITONEUM/RETROPERITONEUM: Within normal limits.  VESSELS: Atherosclerotic changes.  LYMPH NODES: No lymphadenopathy.  ABDOMINAL WALL: Small fat-containing right inguinal hernia.  BONES: Degenerative changes. Grade 1 anterolisthesis of L4 on L5.    IMPRESSION:  No evidence of acute abnormality in the abdomen and pelvis.    < end of copied text >

## 2024-08-04 NOTE — PROGRESS NOTE ADULT - SUBJECTIVE AND OBJECTIVE BOX
PROGRESS NOTE:   Authored by María Veronica MD  Internal Medicine Resident Physician, PGY-1      Patient is a 68y old  Male who presents with a chief complaint of bacteremia (03 Aug 2024 15:33)      SUBJECTIVE / OVERNIGHT EVENTS: ***, patient seen and examined at bedside    MEDICATIONS  (STANDING):  atorvastatin 80 milliGRAM(s) Oral at bedtime  levothyroxine 100 MICROGram(s) Oral daily  piperacillin/tazobactam IVPB.. 3.375 Gram(s) IV Intermittent every 8 hours    MEDICATIONS  (PRN):  acetaminophen     Tablet .. 650 milliGRAM(s) Oral every 6 hours PRN Temp greater or equal to 38C (100.4F), Moderate Pain (4 - 6)  melatonin 3 milliGRAM(s) Oral at bedtime PRN Insomnia      CAPILLARY BLOOD GLUCOSE        I&O's Summary      PHYSICAL EXAM:  Vital Signs Last 24 Hrs  T(C): 37.7 (04 Aug 2024 06:45), Max: 37.7 (04 Aug 2024 06:45)  T(F): 99.9 (04 Aug 2024 06:45), Max: 99.9 (04 Aug 2024 06:45)  HR: 64 (04 Aug 2024 06:45) (62 - 77)  BP: 134/74 (04 Aug 2024 06:45) (105/56 - 134/74)  BP(mean): 69 (03 Aug 2024 15:09) (69 - 69)  RR: 18 (04 Aug 2024 06:45) (18 - 18)  SpO2: 97% (04 Aug 2024 06:45) (95% - 98%)    Parameters below as of 04 Aug 2024 06:45  Patient On (Oxygen Delivery Method): room air      CONSTITUTIONAL: Well-groomed, in no apparent distress  RESPIRATORY: Breathing comfortably; no dullness to percussion; lungs CTA without wheeze/rhonchi/rales  CARDIOVASCULAR: +S1S2, RRR, no M/G/R; pedal pulses full and symmetric; no lower extremity edema  GASTROINTESTINAL: No palpable masses or tenderness, +BS throughout, no rebound/guarding; no hepatosplenomegaly; no hernia palpated  SKIN: No rashes or ulcers noted  NEUROLOGIC: A+O x 3, CN II-XII intact; sensation intact in LEs b/l to light touch    LABS:                        13.4   7.26  )-----------( 76       ( 04 Aug 2024 06:50 )             39.0     08-04    135  |  102  |  15  ----------------------------<  92  4.1   |  23  |  0.93    Ca    8.8      04 Aug 2024 06:50  Phos  1.8     08-04  Mg     1.8     08-04    TPro  5.8<L>  /  Alb  3.2<L>  /  TBili  0.8  /  DBili  x   /  AST  29  /  ALT  28  /  AlkPhos  54  08-04    PT/INR - ( 03 Aug 2024 05:22 )   PT: 16.7 sec;   INR: 1.61 ratio         PTT - ( 03 Aug 2024 05:22 )  PTT:29.1 sec      Urinalysis Basic - ( 04 Aug 2024 06:50 )    Color: x / Appearance: x / SG: x / pH: x  Gluc: 92 mg/dL / Ketone: x  / Bili: x / Urobili: x   Blood: x / Protein: x / Nitrite: x   Leuk Esterase: x / RBC: x / WBC x   Sq Epi: x / Non Sq Epi: x / Bacteria: x        Culture - Urine (collected 02 Aug 2024 12:25)  Source: Clean Catch Clean Catch (Midstream)  Preliminary Report (03 Aug 2024 15:20):    >100,000 CFU/ml Escherichia coli    Culture - Blood (collected 02 Aug 2024 05:56)  Source: .Blood Blood-Peripheral  Gram Stain (03 Aug 2024 04:49):    Growth in aerobic bottle: Gram Negative Rods    Growth in anaerobic bottle: Gram Negative Rods  Preliminary Report (03 Aug 2024 17:06):    Growth in aerobic and anaerobic bottles: Escherichia coli    See previous culture [id number]    Culture - Blood (collected 02 Aug 2024 05:50)  Source: .Blood Blood-Peripheral  Gram Stain (03 Aug 2024 01:34):    Growth in aerobic bottle: Gram Negative Rods    Growth in anaerobic bottle: Gram Negative Rods  Preliminary Report (03 Aug 2024 18:17):    Growth in aerobic and anaerobic bottles: Escherichia coli    Direct identification is available within approximately 3-5    hours either by Blood Panel Multiplexed PCR or Direct    MALDI-TOF. Details: [email or web address]  Organism: Blood Culture PCR (02 Aug 2024 23:19)  Organism: Blood Culture PCR (02 Aug 2024 23:19)        RADIOLOGY & ADDITIONAL TESTS:  Results Reviewed:   Imaging Personally Reviewed:  Electrocardiogram Personally Reviewed:   PROGRESS NOTE:   Authored by María Veronica MD  Internal Medicine Resident Physician, PGY-1      Patient is a 68y old  Male who presents with a chief complaint of bacteremia (03 Aug 2024 15:33)      SUBJECTIVE / OVERNIGHT EVENTS: Patient seen and examined at bedside. No acute events overnight.     MEDICATIONS  (STANDING):  atorvastatin 80 milliGRAM(s) Oral at bedtime  levothyroxine 100 MICROGram(s) Oral daily  piperacillin/tazobactam IVPB.. 3.375 Gram(s) IV Intermittent every 8 hours    MEDICATIONS  (PRN):  acetaminophen     Tablet .. 650 milliGRAM(s) Oral every 6 hours PRN Temp greater or equal to 38C (100.4F), Moderate Pain (4 - 6)  melatonin 3 milliGRAM(s) Oral at bedtime PRN Insomnia      CAPILLARY BLOOD GLUCOSE        I&O's Summary      PHYSICAL EXAM:  Vital Signs Last 24 Hrs  T(C): 37.7 (04 Aug 2024 06:45), Max: 37.7 (04 Aug 2024 06:45)  T(F): 99.9 (04 Aug 2024 06:45), Max: 99.9 (04 Aug 2024 06:45)  HR: 64 (04 Aug 2024 06:45) (62 - 77)  BP: 134/74 (04 Aug 2024 06:45) (105/56 - 134/74)  BP(mean): 69 (03 Aug 2024 15:09) (69 - 69)  RR: 18 (04 Aug 2024 06:45) (18 - 18)  SpO2: 97% (04 Aug 2024 06:45) (95% - 98%)    Parameters below as of 04 Aug 2024 06:45  Patient On (Oxygen Delivery Method): room air      CONSTITUTIONAL: Well-groomed, in no apparent distress  RESPIRATORY: Breathing comfortably; no dullness to percussion; lungs CTA without wheeze/rhonchi/rales  CARDIOVASCULAR: +S1S2, RRR, no M/G/R; pedal pulses full and symmetric; no lower extremity edema  GASTROINTESTINAL: No palpable masses or tenderness, +BS throughout, no rebound/guarding; no hepatosplenomegaly; no hernia palpated  SKIN: No rashes or ulcers noted  NEUROLOGIC: A+O x 3, CN II-XII intact; sensation intact in LEs b/l to light touch    LABS:                        13.4   7.26  )-----------( 76       ( 04 Aug 2024 06:50 )             39.0     08-04    135  |  102  |  15  ----------------------------<  92  4.1   |  23  |  0.93    Ca    8.8      04 Aug 2024 06:50  Phos  1.8     08-04  Mg     1.8     08-04    TPro  5.8<L>  /  Alb  3.2<L>  /  TBili  0.8  /  DBili  x   /  AST  29  /  ALT  28  /  AlkPhos  54  08-04    PT/INR - ( 03 Aug 2024 05:22 )   PT: 16.7 sec;   INR: 1.61 ratio         PTT - ( 03 Aug 2024 05:22 )  PTT:29.1 sec      Urinalysis Basic - ( 04 Aug 2024 06:50 )    Color: x / Appearance: x / SG: x / pH: x  Gluc: 92 mg/dL / Ketone: x  / Bili: x / Urobili: x   Blood: x / Protein: x / Nitrite: x   Leuk Esterase: x / RBC: x / WBC x   Sq Epi: x / Non Sq Epi: x / Bacteria: x        Culture - Urine (collected 02 Aug 2024 12:25)  Source: Clean Catch Clean Catch (Midstream)  Preliminary Report (03 Aug 2024 15:20):    >100,000 CFU/ml Escherichia coli    Culture - Blood (collected 02 Aug 2024 05:56)  Source: .Blood Blood-Peripheral  Gram Stain (03 Aug 2024 04:49):    Growth in aerobic bottle: Gram Negative Rods    Growth in anaerobic bottle: Gram Negative Rods  Preliminary Report (03 Aug 2024 17:06):    Growth in aerobic and anaerobic bottles: Escherichia coli    See previous culture [id number]    Culture - Blood (collected 02 Aug 2024 05:50)  Source: .Blood Blood-Peripheral  Gram Stain (03 Aug 2024 01:34):    Growth in aerobic bottle: Gram Negative Rods    Growth in anaerobic bottle: Gram Negative Rods  Preliminary Report (03 Aug 2024 18:17):    Growth in aerobic and anaerobic bottles: Escherichia coli    Direct identification is available within approximately 3-5    hours either by Blood Panel Multiplexed PCR or Direct    MALDI-TOF. Details: [email or web address]  Organism: Blood Culture PCR (02 Aug 2024 23:19)  Organism: Blood Culture PCR (02 Aug 2024 23:19)        RADIOLOGY & ADDITIONAL TESTS:  Results Reviewed:   Imaging Personally Reviewed:  Electrocardiogram Personally Reviewed:   PROGRESS NOTE:   Authored by María Veronica MD  Internal Medicine Resident Physician, PGY-1      Patient is a 68y old  Male who presents with a chief complaint of bacteremia (03 Aug 2024 15:33)      SUBJECTIVE / OVERNIGHT EVENTS: Patient seen and examined at bedside. No acute events overnight. Tearful during interview as discussing missed training scheduled.     MEDICATIONS  (STANDING):  atorvastatin 80 milliGRAM(s) Oral at bedtime  levothyroxine 100 MICROGram(s) Oral daily  piperacillin/tazobactam IVPB.. 3.375 Gram(s) IV Intermittent every 8 hours    MEDICATIONS  (PRN):  acetaminophen     Tablet .. 650 milliGRAM(s) Oral every 6 hours PRN Temp greater or equal to 38C (100.4F), Moderate Pain (4 - 6)  melatonin 3 milliGRAM(s) Oral at bedtime PRN Insomnia      CAPILLARY BLOOD GLUCOSE        I&O's Summary      PHYSICAL EXAM:  Vital Signs Last 24 Hrs  T(C): 37.7 (04 Aug 2024 06:45), Max: 37.7 (04 Aug 2024 06:45)  T(F): 99.9 (04 Aug 2024 06:45), Max: 99.9 (04 Aug 2024 06:45)  HR: 64 (04 Aug 2024 06:45) (62 - 77)  BP: 134/74 (04 Aug 2024 06:45) (105/56 - 134/74)  BP(mean): 69 (03 Aug 2024 15:09) (69 - 69)  RR: 18 (04 Aug 2024 06:45) (18 - 18)  SpO2: 97% (04 Aug 2024 06:45) (95% - 98%)    Parameters below as of 04 Aug 2024 06:45  Patient On (Oxygen Delivery Method): room air    Vital Signs Last 24 Hrs  T(C): 37.8 (04 Aug 2024 17:49), Max: 39.3 (04 Aug 2024 16:05)  T(F): 100 (04 Aug 2024 17:49), Max: 102.8 (04 Aug 2024 16:05)  HR: 70 (04 Aug 2024 16:05) (64 - 74)  BP: 133/74 (04 Aug 2024 16:05) (116/69 - 148/88)  BP(mean): --  RR: 18 (04 Aug 2024 16:05) (18 - 18)  SpO2: 97% (04 Aug 2024 16:05) (96% - 97%)    Parameters below as of 04 Aug 2024 16:05  Patient On (Oxygen Delivery Method): room air      PHYSICAL EXAM:  GENERAL: NAD, lying in bed comfortably  HEAD:  Atraumatic, Normocephalic  EYES: EOMI, PERRL, conjunctiva and sclera clear  ENT: Moist mucous membranes  CHEST/LUNG: Clear to auscultation bilaterally; No rales, rhonchi, wheezing, or rubs. Unlabored respirations  HEART: Regular rate and rhythm; No murmurs, rubs, or gallops  ABDOMEN: Bowel sounds present; Soft, Nontender, Nondistended.   EXTREMITIES:  2+ Peripheral Pulses No clubbing, cyanosis, or edema  NERVOUS SYSTEM:  Alert & Oriented X3, speech clear. No deficits   MSK: FROM all 4 extremities, full and equal strength. Deformity of right hand.   SKIN: No rashes or lesions        LABS:                        13.4   7.26  )-----------( 76       ( 04 Aug 2024 06:50 )             39.0     08-04    135  |  102  |  15  ----------------------------<  92  4.1   |  23  |  0.93    Ca    8.8      04 Aug 2024 06:50  Phos  1.8     08-04  Mg     1.8     08-04    TPro  5.8<L>  /  Alb  3.2<L>  /  TBili  0.8  /  DBili  x   /  AST  29  /  ALT  28  /  AlkPhos  54  08-04    PT/INR - ( 03 Aug 2024 05:22 )   PT: 16.7 sec;   INR: 1.61 ratio         PTT - ( 03 Aug 2024 05:22 )  PTT:29.1 sec      Urinalysis Basic - ( 04 Aug 2024 06:50 )    Color: x / Appearance: x / SG: x / pH: x  Gluc: 92 mg/dL / Ketone: x  / Bili: x / Urobili: x   Blood: x / Protein: x / Nitrite: x   Leuk Esterase: x / RBC: x / WBC x   Sq Epi: x / Non Sq Epi: x / Bacteria: x        Culture - Urine (collected 02 Aug 2024 12:25)  Source: Clean Catch Clean Catch (Midstream)  Preliminary Report (03 Aug 2024 15:20):    >100,000 CFU/ml Escherichia coli    Culture - Blood (collected 02 Aug 2024 05:56)  Source: .Blood Blood-Peripheral  Gram Stain (03 Aug 2024 04:49):    Growth in aerobic bottle: Gram Negative Rods    Growth in anaerobic bottle: Gram Negative Rods  Preliminary Report (03 Aug 2024 17:06):    Growth in aerobic and anaerobic bottles: Escherichia coli    See previous culture [id number]    Culture - Blood (collected 02 Aug 2024 05:50)  Source: .Blood Blood-Peripheral  Gram Stain (03 Aug 2024 01:34):    Growth in aerobic bottle: Gram Negative Rods    Growth in anaerobic bottle: Gram Negative Rods  Preliminary Report (03 Aug 2024 18:17):    Growth in aerobic and anaerobic bottles: Escherichia coli    Direct identification is available within approximately 3-5    hours either by Blood Panel Multiplexed PCR or Direct    MALDI-TOF. Details: [email or web address]  Organism: Blood Culture PCR (02 Aug 2024 23:19)  Organism: Blood Culture PCR (02 Aug 2024 23:19)        RADIOLOGY & ADDITIONAL TESTS:  Results Reviewed:   Imaging Personally Reviewed:  Electrocardiogram Personally Reviewed:

## 2024-08-04 NOTE — PATIENT PROFILE ADULT - STATED REASON FOR ADMISSION
I came to  the hospital as I I was shivering with 102.8 fever and I was dizzy and couldn't stand up.

## 2024-08-04 NOTE — PROGRESS NOTE ADULT - PROBLEM SELECTOR PLAN 1
wbc 15, fever to 102.9, and source is UTI and E. Coli bacteremia  - okay to c/w Zosyn, f/u repeat bcx, f/u UCx  - source of e. coli is most likely urine, but given initial elevated billirubin, elevated INR, wonder if there is a component of intrabdominal/liver illness - will order RUQUS and CT A/P  - hold antihypertensives On admission: wbc 15, fever to 102.9, and source is UTI and E. Coli bacteremia. Today, 8/4, patient is afebrile with wbc 7.26  - f/u repeat bcx, f/u UCx  - source of e. coli is most likely urine, but given initial elevated billirubin, elevated INR, wonder if there is a component of intrabdominal/liver illness - will order RUQUS and CT A/P  - hold antihypertensives  - Per ID - change azithro to ceftriaxone (in the setting of thrombocytopenia)

## 2024-08-04 NOTE — PHYSICAL THERAPY INITIAL EVALUATION ADULT - PERTINENT HX OF CURRENT PROBLEM, REHAB EVAL
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 Coli Bacteremia likely from UTI. Hosp course: XR chest (8/2) Clear lungs. CT abdomen/pelvis (8/3) No acute findings in the abdomen or pelvis.

## 2024-08-04 NOTE — PROGRESS NOTE ADULT - PROBLEM SELECTOR PLAN 3
UA + for leuk esterase and nitrates w/ wbcs   -started on Zosyn (8/3- )  -f/u UCx, de-escalate as appropriate UA + for leuk esterase and nitrates w/ wbcs   -f/u UCx, de-escalate as appropriate

## 2024-08-04 NOTE — PROGRESS NOTE ADULT - PROBLEM SELECTOR PLAN 2
plt initially 140 and then dropped to 90, unclear origin, but may be related to thrombocytopenia of sepsis  - if continues to drop, will expand workup  - hold chemical ppx, start SCD at this time plt initially 140 and then dropped to 90, unclear origin, but may be related to thrombocytopenia of sepsis  - Per ID - change azithro to ceftriaxone  - hold chemical ppx, start SCD at this time

## 2024-08-05 LAB
ALBUMIN SERPL ELPH-MCNC: 3.5 G/DL — SIGNIFICANT CHANGE UP (ref 3.3–5)
ALP SERPL-CCNC: 63 U/L — SIGNIFICANT CHANGE UP (ref 40–120)
ALT FLD-CCNC: 42 U/L — SIGNIFICANT CHANGE UP (ref 10–45)
ANION GAP SERPL CALC-SCNC: 11 MMOL/L — SIGNIFICANT CHANGE UP (ref 5–17)
AST SERPL-CCNC: 41 U/L — HIGH (ref 10–40)
BASOPHILS # BLD AUTO: 0.02 K/UL — SIGNIFICANT CHANGE UP (ref 0–0.2)
BASOPHILS NFR BLD AUTO: 0.5 % — SIGNIFICANT CHANGE UP (ref 0–2)
BILIRUB SERPL-MCNC: 0.5 MG/DL — SIGNIFICANT CHANGE UP (ref 0.2–1.2)
BUN SERPL-MCNC: 11 MG/DL — SIGNIFICANT CHANGE UP (ref 7–23)
CALCIUM SERPL-MCNC: 9.4 MG/DL — SIGNIFICANT CHANGE UP (ref 8.4–10.5)
CHLORIDE SERPL-SCNC: 102 MMOL/L — SIGNIFICANT CHANGE UP (ref 96–108)
CO2 SERPL-SCNC: 23 MMOL/L — SIGNIFICANT CHANGE UP (ref 22–31)
CREAT SERPL-MCNC: 1.01 MG/DL — SIGNIFICANT CHANGE UP (ref 0.5–1.3)
EGFR: 81 ML/MIN/1.73M2 — SIGNIFICANT CHANGE UP
EOSINOPHIL # BLD AUTO: 0.02 K/UL — SIGNIFICANT CHANGE UP (ref 0–0.5)
EOSINOPHIL NFR BLD AUTO: 0.5 % — SIGNIFICANT CHANGE UP (ref 0–6)
GLUCOSE SERPL-MCNC: 110 MG/DL — HIGH (ref 70–99)
HCT VFR BLD CALC: 42.9 % — SIGNIFICANT CHANGE UP (ref 39–50)
HGB BLD-MCNC: 14.6 G/DL — SIGNIFICANT CHANGE UP (ref 13–17)
IMM GRANULOCYTES NFR BLD AUTO: 0.5 % — SIGNIFICANT CHANGE UP (ref 0–0.9)
LYMPHOCYTES # BLD AUTO: 0.89 K/UL — LOW (ref 1–3.3)
LYMPHOCYTES # BLD AUTO: 20.3 % — SIGNIFICANT CHANGE UP (ref 13–44)
MAGNESIUM SERPL-MCNC: 2.1 MG/DL — SIGNIFICANT CHANGE UP (ref 1.6–2.6)
MCHC RBC-ENTMCNC: 30 PG — SIGNIFICANT CHANGE UP (ref 27–34)
MCHC RBC-ENTMCNC: 34 GM/DL — SIGNIFICANT CHANGE UP (ref 32–36)
MCV RBC AUTO: 88.3 FL — SIGNIFICANT CHANGE UP (ref 80–100)
MONOCYTES # BLD AUTO: 0.52 K/UL — SIGNIFICANT CHANGE UP (ref 0–0.9)
MONOCYTES NFR BLD AUTO: 11.9 % — SIGNIFICANT CHANGE UP (ref 2–14)
NEUTROPHILS # BLD AUTO: 2.91 K/UL — SIGNIFICANT CHANGE UP (ref 1.8–7.4)
NEUTROPHILS NFR BLD AUTO: 66.3 % — SIGNIFICANT CHANGE UP (ref 43–77)
NRBC # BLD: 0 /100 WBCS — SIGNIFICANT CHANGE UP (ref 0–0)
PHOSPHATE SERPL-MCNC: 3.1 MG/DL — SIGNIFICANT CHANGE UP (ref 2.5–4.5)
PLATELET # BLD AUTO: 85 K/UL — LOW (ref 150–400)
POTASSIUM SERPL-MCNC: 4.3 MMOL/L — SIGNIFICANT CHANGE UP (ref 3.5–5.3)
POTASSIUM SERPL-SCNC: 4.3 MMOL/L — SIGNIFICANT CHANGE UP (ref 3.5–5.3)
PROT SERPL-MCNC: 6.3 G/DL — SIGNIFICANT CHANGE UP (ref 6–8.3)
RBC # BLD: 4.86 M/UL — SIGNIFICANT CHANGE UP (ref 4.2–5.8)
RBC # FLD: 13.7 % — SIGNIFICANT CHANGE UP (ref 10.3–14.5)
SODIUM SERPL-SCNC: 136 MMOL/L — SIGNIFICANT CHANGE UP (ref 135–145)
WBC # BLD: 4.38 K/UL — SIGNIFICANT CHANGE UP (ref 3.8–10.5)
WBC # FLD AUTO: 4.38 K/UL — SIGNIFICANT CHANGE UP (ref 3.8–10.5)

## 2024-08-05 PROCEDURE — 99232 SBSQ HOSP IP/OBS MODERATE 35: CPT

## 2024-08-05 PROCEDURE — 99232 SBSQ HOSP IP/OBS MODERATE 35: CPT | Mod: GC

## 2024-08-05 RX ADMIN — Medication 650 MILLIGRAM(S): at 01:44

## 2024-08-05 RX ADMIN — Medication 650 MILLIGRAM(S): at 01:45

## 2024-08-05 RX ADMIN — Medication 100 MILLIGRAM(S): at 13:14

## 2024-08-05 RX ADMIN — Medication 100 MICROGRAM(S): at 05:24

## 2024-08-05 RX ADMIN — Medication 650 MILLIGRAM(S): at 13:44

## 2024-08-05 NOTE — PROGRESS NOTE ADULT - ASSESSMENT
68 m with HTN, hypothyroidism,  presented on 8/2 for fever, sweating, chills, fatigue, discharged from ED with cefpodoxime but blood cx came back positive with pan S E-coli so pt was called back   tmax: 100.1    no WBC   urine cx with E-coli as well  CT negative    fever, rigors due to E-coli bacteremia 8/2, no urinary symptoms but also in the urine and abd CT negative  repeat blood cx negative 8/3 but still febrile, had slight bili elevation initially  now improved, CT and u/s with liver and renal cyst  * if spikes again would get abd MRI/MRCP to look for an alternative source as pt does not have any urinary symptoms  * will switch to PO cipro 500 bid to complete a 10 day course      The above assessment and plan was discussed with the primary team    Juliana Mendoza MD  contact on teams  After 5pm and on weekends call [phone]

## 2024-08-05 NOTE — PROGRESS NOTE ADULT - PROBLEM SELECTOR PLAN 3
UA + for leuk esterase and nitrates w/ wbcs   -f/u UCx, de-escalate as appropriate UA + for leuk esterase and nitrates w/ wbcs, now resolved.

## 2024-08-05 NOTE — PROGRESS NOTE ADULT - SUBJECTIVE AND OBJECTIVE BOX
PROGRESS NOTE:   Authored by María Veronica MD  Internal Medicine Resident Physician, PGY-1      Patient is a 68y old  Male who presents with a chief complaint of bacteremia (04 Aug 2024 13:23)      SUBJECTIVE / OVERNIGHT EVENTS: Patient seen and examined at bedside. Fever to 101.7 rectally overnight, cultures sent.     MEDICATIONS  (STANDING):  atorvastatin 80 milliGRAM(s) Oral at bedtime  cefTRIAXone   IVPB 1000 milliGRAM(s) IV Intermittent every 24 hours  levothyroxine 100 MICROGram(s) Oral daily    MEDICATIONS  (PRN):  acetaminophen     Tablet .. 650 milliGRAM(s) Oral every 6 hours PRN Temp greater or equal to 38C (100.4F), Moderate Pain (4 - 6)  melatonin 3 milliGRAM(s) Oral at bedtime PRN Insomnia      CAPILLARY BLOOD GLUCOSE      POCT Blood Glucose.: 174 mg/dL (04 Aug 2024 12:56)    I&O's Summary    04 Aug 2024 07:01  -  05 Aug 2024 07:00  --------------------------------------------------------  IN: 50 mL / OUT: 0 mL / NET: 50 mL        PHYSICAL EXAM:  Vital Signs Last 24 Hrs  T(C): 36.9 (05 Aug 2024 03:31), Max: 39.3 (04 Aug 2024 16:05)  T(F): 98.5 (05 Aug 2024 03:31), Max: 102.8 (04 Aug 2024 16:05)  HR: 62 (05 Aug 2024 03:31) (62 - 74)  BP: 132/77 (05 Aug 2024 03:31) (119/68 - 148/88)  BP(mean): --  RR: 18 (05 Aug 2024 03:31) (18 - 18)  SpO2: 95% (05 Aug 2024 03:31) (95% - 97%)    Parameters below as of 05 Aug 2024 03:31  Patient On (Oxygen Delivery Method): room air      CONSTITUTIONAL: Well-groomed, in no apparent distress  RESPIRATORY: Breathing comfortably; no dullness to percussion; lungs CTA without wheeze/rhonchi/rales  CARDIOVASCULAR: +S1S2, RRR, no M/G/R; pedal pulses full and symmetric; no lower extremity edema  GASTROINTESTINAL: No palpable masses or tenderness, +BS throughout, no rebound/guarding; no hepatosplenomegaly; no hernia palpated  SKIN: No rashes or ulcers noted  NEUROLOGIC: A+O x 3, CN II-XII intact; sensation intact in LEs b/l to light touch    LABS:                        14.6   4.38  )-----------( 85       ( 05 Aug 2024 07:18 )             42.9     08-04    135  |  102  |  15  ----------------------------<  92  4.1   |  23  |  0.93    Ca    8.8      04 Aug 2024 06:50  Phos  1.8     08-04  Mg     1.8     08-04    TPro  5.8<L>  /  Alb  3.2<L>  /  TBili  0.8  /  DBili  x   /  AST  29  /  ALT  28  /  AlkPhos  54  08-04          Urinalysis Basic - ( 04 Aug 2024 06:50 )    Color: x / Appearance: x / SG: x / pH: x  Gluc: 92 mg/dL / Ketone: x  / Bili: x / Urobili: x   Blood: x / Protein: x / Nitrite: x   Leuk Esterase: x / RBC: x / WBC x   Sq Epi: x / Non Sq Epi: x / Bacteria: x        Culture - Urine (collected 03 Aug 2024 09:16)  Source: Clean Catch Clean Catch (Midstream)  Final Report (04 Aug 2024 19:04):    <10,000 CFU/mL Normal Urogenital Niesha    Culture - Blood (collected 03 Aug 2024 05:12)  Source: .Blood Blood-Peripheral  Preliminary Report (04 Aug 2024 09:02):    No growth at 24 hours    Culture - Blood (collected 03 Aug 2024 04:55)  Source: .Blood Blood-Peripheral  Preliminary Report (04 Aug 2024 09:02):    No growth at 24 hours    Culture - Urine (collected 02 Aug 2024 12:25)  Source: Clean Catch Clean Catch (Midstream)  Preliminary Report (03 Aug 2024 15:20):    >100,000 CFU/ml Escherichia coli        RADIOLOGY & ADDITIONAL TESTS:  Results Reviewed:   Imaging Personally Reviewed:  Electrocardiogram Personally Reviewed:   PROGRESS NOTE:   Authored by María Veronica MD  Internal Medicine Resident Physician, PGY-1      Patient is a 68y old  Male who presents with a chief complaint of bacteremia (04 Aug 2024 13:23)      SUBJECTIVE / OVERNIGHT EVENTS: Patient seen and examined at bedside. Fever to 101.7 rectally overnight, cultures sent.     MEDICATIONS  (STANDING):  atorvastatin 80 milliGRAM(s) Oral at bedtime  cefTRIAXone   IVPB 1000 milliGRAM(s) IV Intermittent every 24 hours  levothyroxine 100 MICROGram(s) Oral daily    MEDICATIONS  (PRN):  acetaminophen     Tablet .. 650 milliGRAM(s) Oral every 6 hours PRN Temp greater or equal to 38C (100.4F), Moderate Pain (4 - 6)  melatonin 3 milliGRAM(s) Oral at bedtime PRN Insomnia      CAPILLARY BLOOD GLUCOSE      POCT Blood Glucose.: 174 mg/dL (04 Aug 2024 12:56)    I&O's Summary    04 Aug 2024 07:01  -  05 Aug 2024 07:00  --------------------------------------------------------  IN: 50 mL / OUT: 0 mL / NET: 50 mL        PHYSICAL EXAM:  Vital Signs Last 24 Hrs  T(C): 36.9 (05 Aug 2024 03:31), Max: 39.3 (04 Aug 2024 16:05)  T(F): 98.5 (05 Aug 2024 03:31), Max: 102.8 (04 Aug 2024 16:05)  HR: 62 (05 Aug 2024 03:31) (62 - 74)  BP: 132/77 (05 Aug 2024 03:31) (119/68 - 148/88)  BP(mean): --  RR: 18 (05 Aug 2024 03:31) (18 - 18)  SpO2: 95% (05 Aug 2024 03:31) (95% - 97%)    Parameters below as of 05 Aug 2024 03:31  Patient On (Oxygen Delivery Method): room air    PHYSICAL EXAM:  GENERAL: NAD, lying in bed comfortably  HEAD:  Atraumatic, Normocephalic  EYES: EOMI, PERRL, conjunctiva and sclera clear  ENT: Moist mucous membranes  CHEST/LUNG: Clear to auscultation bilaterally; No rales, rhonchi, wheezing, or rubs. Unlabored respirations  HEART: Regular rate and rhythm; No murmurs, rubs, or gallops  ABDOMEN: Bowel sounds present; Soft, Nontender, Nondistended.   EXTREMITIES:  2+ Peripheral Pulses No clubbing, cyanosis, or edema  NERVOUS SYSTEM:  Alert & Oriented X3, speech clear. No deficits   MSK: FROM all 4 extremities, full and equal strength. Deformity of right hand.   SKIN: No rashes or lesions        LABS:                        14.6   4.38  )-----------( 85       ( 05 Aug 2024 07:18 )             42.9     08-04    135  |  102  |  15  ----------------------------<  92  4.1   |  23  |  0.93    Ca    8.8      04 Aug 2024 06:50  Phos  1.8     08-04  Mg     1.8     08-04    TPro  5.8<L>  /  Alb  3.2<L>  /  TBili  0.8  /  DBili  x   /  AST  29  /  ALT  28  /  AlkPhos  54  08-04          Urinalysis Basic - ( 04 Aug 2024 06:50 )    Color: x / Appearance: x / SG: x / pH: x  Gluc: 92 mg/dL / Ketone: x  / Bili: x / Urobili: x   Blood: x / Protein: x / Nitrite: x   Leuk Esterase: x / RBC: x / WBC x   Sq Epi: x / Non Sq Epi: x / Bacteria: x        Culture - Urine (collected 03 Aug 2024 09:16)  Source: Clean Catch Clean Catch (Midstream)  Final Report (04 Aug 2024 19:04):    <10,000 CFU/mL Normal Urogenital Niesha    Culture - Blood (collected 03 Aug 2024 05:12)  Source: .Blood Blood-Peripheral  Preliminary Report (04 Aug 2024 09:02):    No growth at 24 hours    Culture - Blood (collected 03 Aug 2024 04:55)  Source: .Blood Blood-Peripheral  Preliminary Report (04 Aug 2024 09:02):    No growth at 24 hours    Culture - Urine (collected 02 Aug 2024 12:25)  Source: Clean Catch Clean Catch (Midstream)  Preliminary Report (03 Aug 2024 15:20):    >100,000 CFU/ml Escherichia coli        RADIOLOGY & ADDITIONAL TESTS:  Results Reviewed:   Imaging Personally Reviewed:  Electrocardiogram Personally Reviewed:   PROGRESS NOTE:   Authored by María Veronica MD  Internal Medicine Resident Physician, PGY-1      Patient is a 68y old  Male who presents with a chief complaint of bacteremia (04 Aug 2024 13:23)      SUBJECTIVE / OVERNIGHT EVENTS:  Overnight- Fever to 101.7 rectally overnight, cultures sent. Patient seen and examined at bedside. He was sweating and having cold sweats overnight with the fever. Now feeling much better.     MEDICATIONS  (STANDING):  atorvastatin 80 milliGRAM(s) Oral at bedtime  cefTRIAXone   IVPB 1000 milliGRAM(s) IV Intermittent every 24 hours  levothyroxine 100 MICROGram(s) Oral daily    MEDICATIONS  (PRN):  acetaminophen     Tablet .. 650 milliGRAM(s) Oral every 6 hours PRN Temp greater or equal to 38C (100.4F), Moderate Pain (4 - 6)  melatonin 3 milliGRAM(s) Oral at bedtime PRN Insomnia      CAPILLARY BLOOD GLUCOSE      POCT Blood Glucose.: 174 mg/dL (04 Aug 2024 12:56)    I&O's Summary    04 Aug 2024 07:01  -  05 Aug 2024 07:00  --------------------------------------------------------  IN: 50 mL / OUT: 0 mL / NET: 50 mL        PHYSICAL EXAM:  Vital Signs Last 24 Hrs  T(C): 36.9 (05 Aug 2024 03:31), Max: 39.3 (04 Aug 2024 16:05)  T(F): 98.5 (05 Aug 2024 03:31), Max: 102.8 (04 Aug 2024 16:05)  HR: 62 (05 Aug 2024 03:31) (62 - 74)  BP: 132/77 (05 Aug 2024 03:31) (119/68 - 148/88)  BP(mean): --  RR: 18 (05 Aug 2024 03:31) (18 - 18)  SpO2: 95% (05 Aug 2024 03:31) (95% - 97%)    Parameters below as of 05 Aug 2024 03:31  Patient On (Oxygen Delivery Method): room air    PHYSICAL EXAM:  GENERAL: NAD, lying in bed comfortably  HEAD:  Atraumatic, Normocephalic  EYES: EOMI, PERRL, conjunctiva and sclera clear  ENT: Moist mucous membranes  CHEST/LUNG: Clear to auscultation bilaterally; No rales, rhonchi, wheezing, or rubs. Unlabored respirations  HEART: Regular rate and rhythm; No murmurs, rubs, or gallops  ABDOMEN: Bowel sounds present; Soft, Nontender, Nondistended.   EXTREMITIES:  2+ Peripheral Pulses No clubbing, cyanosis, or edema  NERVOUS SYSTEM:  Alert & Oriented X3, speech clear. No deficits   MSK: FROM all 4 extremities, full and equal strength. Deformity of right hand.   SKIN: No rashes or lesions        LABS:                        14.6   4.38  )-----------( 85       ( 05 Aug 2024 07:18 )             42.9     08-04    135  |  102  |  15  ----------------------------<  92  4.1   |  23  |  0.93    Ca    8.8      04 Aug 2024 06:50  Phos  1.8     08-04  Mg     1.8     08-04    TPro  5.8<L>  /  Alb  3.2<L>  /  TBili  0.8  /  DBili  x   /  AST  29  /  ALT  28  /  AlkPhos  54  08-04          Urinalysis Basic - ( 04 Aug 2024 06:50 )    Color: x / Appearance: x / SG: x / pH: x  Gluc: 92 mg/dL / Ketone: x  / Bili: x / Urobili: x   Blood: x / Protein: x / Nitrite: x   Leuk Esterase: x / RBC: x / WBC x   Sq Epi: x / Non Sq Epi: x / Bacteria: x        Culture - Urine (collected 03 Aug 2024 09:16)  Source: Clean Catch Clean Catch (Midstream)  Final Report (04 Aug 2024 19:04):    <10,000 CFU/mL Normal Urogenital Niesha    Culture - Blood (collected 03 Aug 2024 05:12)  Source: .Blood Blood-Peripheral  Preliminary Report (04 Aug 2024 09:02):    No growth at 24 hours    Culture - Blood (collected 03 Aug 2024 04:55)  Source: .Blood Blood-Peripheral  Preliminary Report (04 Aug 2024 09:02):    No growth at 24 hours    Culture - Urine (collected 02 Aug 2024 12:25)  Source: Clean Catch Clean Catch (Midstream)  Preliminary Report (03 Aug 2024 15:20):    >100,000 CFU/ml Escherichia coli        RADIOLOGY & ADDITIONAL TESTS:  Results Reviewed:   Imaging Personally Reviewed:  Electrocardiogram Personally Reviewed:

## 2024-08-05 NOTE — CHART NOTE - NSCHARTNOTEFT_GEN_A_CORE
Was notified of fever to 101.7. On repeat, these temperatures were lower to 100.3. On Rectal temperature was 101.7.    Given patient recently deescalated to ceftriaxone from Zosyn, repeat BCx were ordered. No other SIRS criteria so no escalation of ABx was warranted at this time.   Further management deferred to the day team.

## 2024-08-05 NOTE — PROGRESS NOTE ADULT - SUBJECTIVE AND OBJECTIVE BOX
Please see the attached refill request.     Follow Up:  bacteremia    Interval History/ROS: pt had fever overnight but now feels well, no fever, chills, nausea, vomiting, dysuria           Allergies  No Known Allergies        ANTIMICROBIALS:  cefTRIAXone   IVPB 1000 every 24 hours      OTHER MEDS:  acetaminophen     Tablet .. 650 milliGRAM(s) Oral every 6 hours PRN  atorvastatin 80 milliGRAM(s) Oral at bedtime  levothyroxine 100 MICROGram(s) Oral daily  melatonin 3 milliGRAM(s) Oral at bedtime PRN      Vital Signs Last 24 Hrs  T(C): 38.1 (05 Aug 2024 13:41), Max: 39.3 (04 Aug 2024 16:05)  T(F): 100.5 (05 Aug 2024 13:41), Max: 102.8 (04 Aug 2024 16:05)  HR: 62 (05 Aug 2024 03:31) (62 - 73)  BP: 140/80 (05 Aug 2024 13:32) (121/71 - 140/80)  BP(mean): --  RR: 18 (05 Aug 2024 13:32) (18 - 18)  SpO2: 96% (05 Aug 2024 13:32) (95% - 97%)    Parameters below as of 05 Aug 2024 13:32  Patient On (Oxygen Delivery Method): room air        Physical Exam:  General:    NAD,  non toxic  Respiratory:   comfortable on RA  abd:     soft,    no tenderness  :   no CVAT,  no suprapubic tenderness,   no  pickett  Musculoskeletal:   no joint swelling  vascular: no phlebitis  Skin:    no rash                        14.6   4.38  )-----------( 85       ( 05 Aug 2024 07:18 )             42.9       08-05    136  |  102  |  11  ----------------------------<  110<H>  4.3   |  23  |  1.01    Ca    9.4      05 Aug 2024 07:24  Phos  3.1     08-05  Mg     2.1     08-05    TPro  6.3  /  Alb  3.5  /  TBili  0.5  /  DBili  x   /  AST  41<H>  /  ALT  42  /  AlkPhos  63  08-05      Urinalysis Basic - ( 05 Aug 2024 07:24 )    Color: x / Appearance: x / SG: x / pH: x  Gluc: 110 mg/dL / Ketone: x  / Bili: x / Urobili: x   Blood: x / Protein: x / Nitrite: x   Leuk Esterase: x / RBC: x / WBC x   Sq Epi: x / Non Sq Epi: x / Bacteria: x        MICROBIOLOGY:  v  Clean Catch Clean Catch (Midstream)  08-03-24   <10,000 CFU/mL Normal Urogenital Niesha  --  --      .Blood Blood-Peripheral  08-03-24   No growth at 48 Hours  --  --      .Blood Blood-Peripheral  08-03-24   No growth at 48 Hours  --  --      Clean Catch Clean Catch (Midstream)  08-02-24   >100,000 CFU/ml Escherichia coli  --  --      .Blood Blood-Peripheral  08-02-24   Growth in aerobic and anaerobic bottles: Escherichia coli  See previous culture [id number]  --    Growth in aerobic bottle: Gram Negative Rods  Growth in anaerobic bottle: Gram Negative Rods      .Blood Blood-Peripheral  08-02-24   Growth in aerobic and anaerobic bottles: Escherichia coli  Direct identification is available within approximately 3-5  hours either by Blood Panel Multiplexed PCR or Direct  MALDI-TOF. Details: [email or web address]  --  Blood Culture PCR  Escherichia coli                RADIOLOGY:  Images independently visualized and reviewed personally, findings as below  < from: US Abdomen Upper Quadrant Right (08.04.24 @ 11:27) >    IMPRESSION:  Gallstone.    Hepatic cysts and right renal cyst.    < end of copied text >  < from: CT Abdomen and Pelvis w/ IV Cont (08.03.24 @ 19:41) >  IMPRESSION:  No evidence of acute abnormality in the abdomen and pelvis.      < end of copied text >

## 2024-08-05 NOTE — PROGRESS NOTE ADULT - PROBLEM SELECTOR PLAN 1
On admission: wbc 15, fever to 102.9, and source is UTI and E. Coli bacteremia. Today, 8/5, patient was febrile overnight with wbc 4.3.   - f/u repeat bcx, f/u UCx  - source of e. coli is most likely urine, but given initial elevated billirubin, elevated INR, wonder if there is a component of intrabdominal/liver illness - will order RUQUS and CT A/P  - hold antihypertensives  - Per ID - change azithro to ceftriaxone (in the setting of thrombocytopenia) On admission: wbc 15, fever to 102.9, and source is UTI and E. Coli bacteremia. Today, 8/5, patient was febrile overnight with wbc 4.3.   - f/u repeat bcx  - source of e. coli is most likely urine, but given initial elevated billirubin, elevated INR, wonder if there is a component of intrabdominal/liver illness - will order RUQUS and CT A/P  - hold antihypertensives  - Per ID - change azithro to ceftriaxone (in the setting of thrombocytopenia)

## 2024-08-05 NOTE — PROGRESS NOTE ADULT - PROBLEM SELECTOR PLAN 2
plt initially 140 and then dropped to 90, unclear origin, but may be related to thrombocytopenia of sepsis  - Per ID - change azithro to ceftriaxone  - hold chemical ppx, start SCD at this time

## 2024-08-06 ENCOUNTER — TRANSCRIPTION ENCOUNTER (OUTPATIENT)
Age: 69
End: 2024-08-06

## 2024-08-06 VITALS
OXYGEN SATURATION: 95 % | HEART RATE: 56 BPM | DIASTOLIC BLOOD PRESSURE: 73 MMHG | RESPIRATION RATE: 18 BRPM | TEMPERATURE: 99 F | SYSTOLIC BLOOD PRESSURE: 121 MMHG

## 2024-08-06 LAB
ALBUMIN SERPL ELPH-MCNC: 3.3 G/DL — SIGNIFICANT CHANGE UP (ref 3.3–5)
ALP SERPL-CCNC: 52 U/L — SIGNIFICANT CHANGE UP (ref 40–120)
ALT FLD-CCNC: 49 U/L — HIGH (ref 10–45)
ANION GAP SERPL CALC-SCNC: 12 MMOL/L — SIGNIFICANT CHANGE UP (ref 5–17)
ANISOCYTOSIS BLD QL: SLIGHT — SIGNIFICANT CHANGE UP
AST SERPL-CCNC: 39 U/L — SIGNIFICANT CHANGE UP (ref 10–40)
BASOPHILS # BLD AUTO: 0 K/UL — SIGNIFICANT CHANGE UP (ref 0–0.2)
BASOPHILS NFR BLD AUTO: 0 % — SIGNIFICANT CHANGE UP (ref 0–2)
BILIRUB SERPL-MCNC: 0.4 MG/DL — SIGNIFICANT CHANGE UP (ref 0.2–1.2)
BUN SERPL-MCNC: 13 MG/DL — SIGNIFICANT CHANGE UP (ref 7–23)
CALCIUM SERPL-MCNC: 8.8 MG/DL — SIGNIFICANT CHANGE UP (ref 8.4–10.5)
CHLORIDE SERPL-SCNC: 101 MMOL/L — SIGNIFICANT CHANGE UP (ref 96–108)
CO2 SERPL-SCNC: 24 MMOL/L — SIGNIFICANT CHANGE UP (ref 22–31)
CREAT SERPL-MCNC: 0.99 MG/DL — SIGNIFICANT CHANGE UP (ref 0.5–1.3)
DACRYOCYTES BLD QL SMEAR: SLIGHT — SIGNIFICANT CHANGE UP
EGFR: 83 ML/MIN/1.73M2 — SIGNIFICANT CHANGE UP
EOSINOPHIL # BLD AUTO: 0.05 K/UL — SIGNIFICANT CHANGE UP (ref 0–0.5)
EOSINOPHIL NFR BLD AUTO: 0.9 % — SIGNIFICANT CHANGE UP (ref 0–6)
GLUCOSE SERPL-MCNC: 91 MG/DL — SIGNIFICANT CHANGE UP (ref 70–99)
HCT VFR BLD CALC: 38.9 % — LOW (ref 39–50)
HGB BLD-MCNC: 13.4 G/DL — SIGNIFICANT CHANGE UP (ref 13–17)
LYMPHOCYTES # BLD AUTO: 0.99 K/UL — LOW (ref 1–3.3)
LYMPHOCYTES # BLD AUTO: 18.6 % — SIGNIFICANT CHANGE UP (ref 13–44)
MAGNESIUM SERPL-MCNC: 1.9 MG/DL — SIGNIFICANT CHANGE UP (ref 1.6–2.6)
MANUAL SMEAR VERIFICATION: SIGNIFICANT CHANGE UP
MCHC RBC-ENTMCNC: 30 PG — SIGNIFICANT CHANGE UP (ref 27–34)
MCHC RBC-ENTMCNC: 34.4 GM/DL — SIGNIFICANT CHANGE UP (ref 32–36)
MCV RBC AUTO: 87.2 FL — SIGNIFICANT CHANGE UP (ref 80–100)
MONOCYTES # BLD AUTO: 0.91 K/UL — HIGH (ref 0–0.9)
MONOCYTES NFR BLD AUTO: 17 % — HIGH (ref 2–14)
NEUTROPHILS # BLD AUTO: 3.38 K/UL — SIGNIFICANT CHANGE UP (ref 1.8–7.4)
NEUTROPHILS NFR BLD AUTO: 59.3 % — SIGNIFICANT CHANGE UP (ref 43–77)
NEUTS BAND # BLD: 4.2 % — SIGNIFICANT CHANGE UP (ref 0–8)
OVALOCYTES BLD QL SMEAR: SLIGHT — SIGNIFICANT CHANGE UP
PHOSPHATE SERPL-MCNC: 3.7 MG/DL — SIGNIFICANT CHANGE UP (ref 2.5–4.5)
PLAT MORPH BLD: NORMAL — SIGNIFICANT CHANGE UP
PLATELET # BLD AUTO: 86 K/UL — LOW (ref 150–400)
POIKILOCYTOSIS BLD QL AUTO: SLIGHT — SIGNIFICANT CHANGE UP
POTASSIUM SERPL-MCNC: 4.4 MMOL/L — SIGNIFICANT CHANGE UP (ref 3.5–5.3)
POTASSIUM SERPL-SCNC: 4.4 MMOL/L — SIGNIFICANT CHANGE UP (ref 3.5–5.3)
PROT SERPL-MCNC: 5.7 G/DL — LOW (ref 6–8.3)
RBC # BLD: 4.46 M/UL — SIGNIFICANT CHANGE UP (ref 4.2–5.8)
RBC # FLD: 13.6 % — SIGNIFICANT CHANGE UP (ref 10.3–14.5)
RBC BLD AUTO: ABNORMAL
SODIUM SERPL-SCNC: 137 MMOL/L — SIGNIFICANT CHANGE UP (ref 135–145)
WBC # BLD: 5.33 K/UL — SIGNIFICANT CHANGE UP (ref 3.8–10.5)
WBC # FLD AUTO: 5.33 K/UL — SIGNIFICANT CHANGE UP (ref 3.8–10.5)

## 2024-08-06 PROCEDURE — 85018 HEMOGLOBIN: CPT

## 2024-08-06 PROCEDURE — 82962 GLUCOSE BLOOD TEST: CPT

## 2024-08-06 PROCEDURE — 85014 HEMATOCRIT: CPT

## 2024-08-06 PROCEDURE — 87077 CULTURE AEROBIC IDENTIFY: CPT

## 2024-08-06 PROCEDURE — 87637 SARSCOV2&INF A&B&RSV AMP PRB: CPT

## 2024-08-06 PROCEDURE — 82435 ASSAY OF BLOOD CHLORIDE: CPT

## 2024-08-06 PROCEDURE — 85025 COMPLETE CBC W/AUTO DIFF WBC: CPT

## 2024-08-06 PROCEDURE — 87186 SC STD MICRODIL/AGAR DIL: CPT

## 2024-08-06 PROCEDURE — 84132 ASSAY OF SERUM POTASSIUM: CPT

## 2024-08-06 PROCEDURE — 80053 COMPREHEN METABOLIC PANEL: CPT

## 2024-08-06 PROCEDURE — 71045 X-RAY EXAM CHEST 1 VIEW: CPT

## 2024-08-06 PROCEDURE — 87040 BLOOD CULTURE FOR BACTERIA: CPT

## 2024-08-06 PROCEDURE — 85730 THROMBOPLASTIN TIME PARTIAL: CPT

## 2024-08-06 PROCEDURE — 85610 PROTHROMBIN TIME: CPT

## 2024-08-06 PROCEDURE — 36415 COLL VENOUS BLD VENIPUNCTURE: CPT

## 2024-08-06 PROCEDURE — 76705 ECHO EXAM OF ABDOMEN: CPT

## 2024-08-06 PROCEDURE — 81001 URINALYSIS AUTO W/SCOPE: CPT

## 2024-08-06 PROCEDURE — 84100 ASSAY OF PHOSPHORUS: CPT

## 2024-08-06 PROCEDURE — 99285 EMERGENCY DEPT VISIT HI MDM: CPT | Mod: 25

## 2024-08-06 PROCEDURE — 99291 CRITICAL CARE FIRST HOUR: CPT

## 2024-08-06 PROCEDURE — 96374 THER/PROPH/DIAG INJ IV PUSH: CPT

## 2024-08-06 PROCEDURE — 93005 ELECTROCARDIOGRAM TRACING: CPT

## 2024-08-06 PROCEDURE — 84295 ASSAY OF SERUM SODIUM: CPT

## 2024-08-06 PROCEDURE — 83735 ASSAY OF MAGNESIUM: CPT

## 2024-08-06 PROCEDURE — 99233 SBSQ HOSP IP/OBS HIGH 50: CPT | Mod: GC

## 2024-08-06 PROCEDURE — 74177 CT ABD & PELVIS W/CONTRAST: CPT | Mod: MC

## 2024-08-06 PROCEDURE — 83605 ASSAY OF LACTIC ACID: CPT

## 2024-08-06 PROCEDURE — 87086 URINE CULTURE/COLONY COUNT: CPT

## 2024-08-06 PROCEDURE — 82947 ASSAY GLUCOSE BLOOD QUANT: CPT

## 2024-08-06 PROCEDURE — 97161 PT EVAL LOW COMPLEX 20 MIN: CPT

## 2024-08-06 PROCEDURE — 96375 TX/PRO/DX INJ NEW DRUG ADDON: CPT

## 2024-08-06 PROCEDURE — 82803 BLOOD GASES ANY COMBINATION: CPT

## 2024-08-06 PROCEDURE — 99232 SBSQ HOSP IP/OBS MODERATE 35: CPT

## 2024-08-06 PROCEDURE — 82330 ASSAY OF CALCIUM: CPT

## 2024-08-06 PROCEDURE — 87150 DNA/RNA AMPLIFIED PROBE: CPT

## 2024-08-06 RX ORDER — CIPROFLOXACIN 500 MG/1
1 TABLET, FILM COATED ORAL
Qty: 13 | Refills: 0
Start: 2024-08-06

## 2024-08-06 RX ADMIN — Medication 100 MILLIGRAM(S): at 13:07

## 2024-08-06 RX ADMIN — Medication 100 MICROGRAM(S): at 06:23

## 2024-08-06 NOTE — PROGRESS NOTE ADULT - SUBJECTIVE AND OBJECTIVE BOX
PROGRESS NOTE:   Authored by María Veronica MD  Internal Medicine Resident Physician, PGY-1      Patient is a 68y old  Male who presents with a chief complaint of bacteremia (06 Aug 2024 07:17)      SUBJECTIVE / OVERNIGHT EVENTS: ***, patient seen and examined at bedside    MEDICATIONS  (STANDING):  atorvastatin 80 milliGRAM(s) Oral at bedtime  cefTRIAXone   IVPB 1000 milliGRAM(s) IV Intermittent every 24 hours  levothyroxine 100 MICROGram(s) Oral daily    MEDICATIONS  (PRN):  acetaminophen     Tablet .. 650 milliGRAM(s) Oral every 6 hours PRN Temp greater or equal to 38C (100.4F), Moderate Pain (4 - 6)  melatonin 3 milliGRAM(s) Oral at bedtime PRN Insomnia      CAPILLARY BLOOD GLUCOSE        I&O's Summary      PHYSICAL EXAM:  Vital Signs Last 24 Hrs  T(C): 37.3 (06 Aug 2024 04:24), Max: 38.1 (05 Aug 2024 13:41)  T(F): 99.1 (06 Aug 2024 04:24), Max: 100.5 (05 Aug 2024 13:41)  HR: 59 (06 Aug 2024 04:24) (58 - 60)  BP: 132/81 (06 Aug 2024 04:24) (131/80 - 140/80)  BP(mean): --  RR: 18 (06 Aug 2024 04:24) (16 - 18)  SpO2: 96% (06 Aug 2024 04:24) (95% - 96%)    Parameters below as of 06 Aug 2024 04:24  Patient On (Oxygen Delivery Method): room air    GENERAL: NAD, lying in bed comfortably  HEAD:  Atraumatic, Normocephalic  EYES: EOMI, PERRL, conjunctiva and sclera clear  ENT: Moist mucous membranes  CHEST/LUNG: Clear to auscultation bilaterally; No rales, rhonchi, wheezing, or rubs. Unlabored respirations  HEART: Regular rate and rhythm; No murmurs, rubs, or gallops  ABDOMEN: Bowel sounds present; Soft, Nontender, Nondistended.   EXTREMITIES:  2+ Peripheral Pulses No clubbing, cyanosis, or edema  NERVOUS SYSTEM:  Alert & Oriented X3, speech clear. No deficits   MSK: FROM all 4 extremities, full and equal strength. Deformity of right hand.   SKIN: No rashes or lesions      LABS:                        14.6   4.38  )-----------( 85       ( 05 Aug 2024 07:18 )             42.9     08-06    137  |  101  |  13  ----------------------------<  91  4.4   |  24  |  0.99    Ca    8.8      06 Aug 2024 06:37  Phos  3.7     08-06  Mg     1.9     08-06    TPro  5.7<L>  /  Alb  3.3  /  TBili  0.4  /  DBili  x   /  AST  39  /  ALT  49<H>  /  AlkPhos  52  08-06          Urinalysis Basic - ( 06 Aug 2024 06:37 )    Color: x / Appearance: x / SG: x / pH: x  Gluc: 91 mg/dL / Ketone: x  / Bili: x / Urobili: x   Blood: x / Protein: x / Nitrite: x   Leuk Esterase: x / RBC: x / WBC x   Sq Epi: x / Non Sq Epi: x / Bacteria: x        Culture - Urine (collected 03 Aug 2024 09:16)  Source: Clean Catch Clean Catch (Midstream)  Final Report (04 Aug 2024 19:04):    <10,000 CFU/mL Normal Urogenital Niesha        RADIOLOGY & ADDITIONAL TESTS:  Results Reviewed:   Imaging Personally Reviewed:  Electrocardiogram Personally Reviewed:   PROGRESS NOTE:   Authored by María Veronica MD  Internal Medicine Resident Physician, PGY-1      Patient is a 68y old  Male who presents with a chief complaint of bacteremia (06 Aug 2024 07:17)      SUBJECTIVE / OVERNIGHT EVENTS: Patient seen and examined at bedside. He says that he is feeling well and back to normal. No fevers overnight, no night sweats. Denies dysuria, abdominal pain, or n/v.    MEDICATIONS  (STANDING):  atorvastatin 80 milliGRAM(s) Oral at bedtime  cefTRIAXone   IVPB 1000 milliGRAM(s) IV Intermittent every 24 hours  levothyroxine 100 MICROGram(s) Oral daily    MEDICATIONS  (PRN):  acetaminophen     Tablet .. 650 milliGRAM(s) Oral every 6 hours PRN Temp greater or equal to 38C (100.4F), Moderate Pain (4 - 6)  melatonin 3 milliGRAM(s) Oral at bedtime PRN Insomnia      CAPILLARY BLOOD GLUCOSE        I&O's Summary      PHYSICAL EXAM:  Vital Signs Last 24 Hrs  T(C): 37.3 (06 Aug 2024 04:24), Max: 38.1 (05 Aug 2024 13:41)  T(F): 99.1 (06 Aug 2024 04:24), Max: 100.5 (05 Aug 2024 13:41)  HR: 59 (06 Aug 2024 04:24) (58 - 60)  BP: 132/81 (06 Aug 2024 04:24) (131/80 - 140/80)  BP(mean): --  RR: 18 (06 Aug 2024 04:24) (16 - 18)  SpO2: 96% (06 Aug 2024 04:24) (95% - 96%)    Parameters below as of 06 Aug 2024 04:24  Patient On (Oxygen Delivery Method): room air    GENERAL: NAD, lying in bed comfortably  HEAD:  Atraumatic, Normocephalic  EYES: EOMI, PERRL, conjunctiva and sclera clear  ENT: Moist mucous membranes  CHEST/LUNG: Clear to auscultation bilaterally; No rales, rhonchi, wheezing, or rubs. Unlabored respirations  HEART: Regular rate and rhythm; No murmurs, rubs, or gallops  ABDOMEN: Bowel sounds present; Soft, Nontender, Nondistended.   EXTREMITIES:  2+ Peripheral Pulses No clubbing, cyanosis, or edema  NERVOUS SYSTEM:  Alert & Oriented X3, speech clear. No deficits   MSK: FROM all 4 extremities, full and equal strength. Deformity of right hand.   SKIN: No rashes or lesions      LABS:                        14.6   4.38  )-----------( 85       ( 05 Aug 2024 07:18 )             42.9     08-06    137  |  101  |  13  ----------------------------<  91  4.4   |  24  |  0.99    Ca    8.8      06 Aug 2024 06:37  Phos  3.7     08-06  Mg     1.9     08-06    TPro  5.7<L>  /  Alb  3.3  /  TBili  0.4  /  DBili  x   /  AST  39  /  ALT  49<H>  /  AlkPhos  52  08-06          Urinalysis Basic - ( 06 Aug 2024 06:37 )    Color: x / Appearance: x / SG: x / pH: x  Gluc: 91 mg/dL / Ketone: x  / Bili: x / Urobili: x   Blood: x / Protein: x / Nitrite: x   Leuk Esterase: x / RBC: x / WBC x   Sq Epi: x / Non Sq Epi: x / Bacteria: x        Culture - Urine (collected 03 Aug 2024 09:16)  Source: Clean Catch Clean Catch (Midstream)  Final Report (04 Aug 2024 19:04):    <10,000 CFU/mL Normal Urogenital Niesha        RADIOLOGY & ADDITIONAL TESTS:  Results Reviewed:   Imaging Personally Reviewed:  Electrocardiogram Personally Reviewed:

## 2024-08-06 NOTE — DISCHARGE NOTE PROVIDER - CARE PROVIDER_API CALL
FRANSISCO WESLEY  [phone] [facility] [address] [place] [id number]  Phone: [phone]  Fax: [phone]  Follow Up Time: 1 week

## 2024-08-06 NOTE — PROGRESS NOTE ADULT - PROBLEM SELECTOR PLAN 1
On admission: wbc 15, fever to 102.9, and source is UTI and E. Coli bacteremia. Today, 8/5, patient was febrile overnight with wbc 4.3.   - f/u repeat bcx  - source of e. coli is most likely urine, but given initial elevated billirubin, elevated INR, wonder if there is a component of intrabdominal/liver illness - will order RUQUS and CT A/P  - hold antihypertensives  - Per ID - change azithro to ceftriaxone (in the setting of thrombocytopenia)

## 2024-08-06 NOTE — DISCHARGE NOTE PROVIDER - NSDCCPCAREPLAN_GEN_ALL_CORE_FT
PRINCIPAL DISCHARGE DIAGNOSIS  Diagnosis: Bacteremia  Assessment and Plan of Treatment: Bacteremia is bacteria in the blood. Bacteremia happens when germs from infections in your body travel to your blood. E. Coli is the type of bacteria that was found in your blood. Infections such as urinary tract infections have been known to advance into bacteremia.   How can you care for yourself at home?  >Be safe with medicines. Take your medicines exactly as prescribed. Call your doctor or nurse advice line if you think you are having a problem with your medicine.  >If your doctor prescribed antibiotics, take them as directed. Do not stop taking them just because you feel better. You need to take the full course of antibiotics.  >Help prevent future infections. Avoid infections such as COVID-19, colds, and influenza (flu). Wash your hands often. Stay up to date on your COVID-19 vaccines. And get a flu vaccine every year.  >Ask your doctor if you need a pneumococcal vaccine (to prevent pneumonia, meningitis, and other infections). If you have had one before, ask your doctor if you need another dose.  >Clean any wounds or scrapes.  >Drink plenty of fluids to prevent dehydration.  >Eat a healthy diet. Include fruits, vegetables, and whole grains in your diet every day.  >Try doing some physical activity as tolerated        SECONDARY DISCHARGE DIAGNOSES  Diagnosis: Urinary tract infection  Assessment and Plan of Treatment: A urinary tract infection, or UTI, is a term for an infection anywhere between the kidneys and the urethra. (The urethra is the tube that carries urine from the bladder to outside the body.) Most UTIs are bladder infections. They often cause pain or burning when you urinate.  Your UTI was caused by a bacteria called E. Coli and treated with antibiotics. Be sure to complete your treatment so that the infection does not return.   How can you care for yourself at home?  >Take your antibiotics as prescribed. Do not stop taking them just because you feel better. You need to take the full course of antibiotics.  >Take your medicines exactly as prescribed. Be sure to take all of your antibiotics, which treat the infection.  >Drink extra water for the next day or two. This will help make the urine less concentrated and help wash out the bacteria causing the infection. (If you have kidney, heart, or liver disease and have to limit your fluids, talk with your doctor before you increase your fluid intake.)  >Avoid drinks that are carbonated or have caffeine. They can irritate the bladder.  >Urinate often. Try to empty your bladder each time.  To help prevent UTIs--  >Drink plenty of fluids. If you have kidney, heart, or liver disease and have to limit fluids, talk with your doctor before you increase the amount of fluids you drink.  >Urinate when you have the urge. Do not hold your urine for a long time. Urinate before you go to sleep.  >Keep your penis clean.

## 2024-08-06 NOTE — PROGRESS NOTE ADULT - PROBLEM SELECTOR PLAN 1
On admission: wbc 15, fever to 102.9, and source is UTI and E. Coli bacteremia. Today, 8/5, patient was febrile overnight with wbc 4.3.   - f/u repeat bcx  - source of e. coli is most likely urine, but given initial elevated billirubin, elevated INR, wonder if there is a component of intrabdominal/liver illness. RUQ and CT A/P no acute findings  - hold antihypertensives  - Per ID - change azithro to ceftriaxone (in the setting of thrombocytopenia) On admission: wbc 15, fever to 102.9, and source is UTI and E. Coli bacteremia. Today, 8/6, patient was febrile overnight with wbc 5.33.   - repeat bcx showing no growth  - source of e. coli is most likely urine, but given initial elevated billirubin, elevated INR, wonder if there is a component of intrabdominal/liver illness. RUQ and CT A/P no acute findings  - hold antihypertensives  - Per ID - change azithro to ceftriaxone (in the setting of thrombocytopenia)

## 2024-08-06 NOTE — DISCHARGE NOTE PROVIDER - HOSPITAL COURSE
Patient is a 68 year old male with history of HTN, HLD and Hypothyroid admitted for E. Coli bacteremia. The patient originally presented to the ED 8/2 for malaise, fever, chills, shaking, and intense sweating upon waking. At that time he was found to have a fever of 102 in the ED with positive urine cultures and sent home on oral antibiotics.       Patient is a 68 year old male with history of HTN, HLD and Hypothyroid admitted for E. Coli bacteremia. The patient originally presented to the ED 8/2 for malaise, fever, chills, shaking, and intense sweating upon waking. At that time he was found to have a fever of 102 in the ED with positive urine cultures and sent home on oral cefuroxime. He was called back and admitted when his previous blood cultures grew E. Coli.     On labs this admission, leukocytosis was noted along with thrombocytopenia of unclear etiology and mild elevation of INR. Low grade fever in the ED with temps of 102 reported at home. The patient was started on IV broad spectrum antibiotics and sepsis workup was initiated. Right upper quadrant ultrasound, chest Xray, CT abdomen/pelvis did not show any acute pathology. Repeat urinalysis was negative for bacteria and blood cultures were negative after 24 hrs with subsequent labs showing normal white blood cell count. Infectious disease was consulted and patient was switched from zosyn to ceftriaxone in the setting of continued thrombocytopenia.     Course complicated by episodes of continued fever, Tmax 101.7 and night sweats. Blood cultures were sent again and were negative after 24 hours with the previous blood cultures now 72 hours with no growth. The patient was hemodynamically stable throughout and admission and continued on home statin. Upon discharge the patient was afebrile for 24 hours and resolution of fever symptoms.    HPI:  Patient is a 68 year old male with history of HTN, HLD and Hypothyroid who presents after being called back regarding bacteremia. 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 States that on Thursday 8/1, he woke up drenched in sweat, felt very chilly and started shaking in rigors. States he also felt dizzy at that time. States that when he checked, he had fever of 102F. He was very weak and tired and almost fainted and so family called 911. That day he also had decreased urine output but denies any burning with urination, suprapubic pain, lower back pain. Otherwise has not had any fevers before that day. States that he otherwise did not have any abdominal pain, any right upper quadrant pain.     In the original ED, he had fever of 102.9, elevated lactate, found to have UTI and sent home on oral abx. However he was called back one day later due to positive blood cultures.  (03 Aug 2024 15:33)    Hospital Course:  Patient is a 68 year old male with history of HTN, HLD and Hypothyroid admitted for E. Coli bacteremia. The patient originally presented to the ED 8/2 for malaise, fever, chills, shaking, and intense sweating upon waking. At that time he was found to have a fever of 102 in the ED with positive urine cultures and sent home on oral cefuroxime. He was called back and admitted when his previous blood cultures grew E. Coli.     On labs this admission, leukocytosis was noted along with thrombocytopenia of unclear etiology and mild elevation of INR. Low grade fever in the ED with temps of 102 reported at home. The patient was started on IV broad spectrum antibiotics and sepsis workup was initiated. Right upper quadrant ultrasound, chest Xray, CT abdomen/pelvis did not show any acute pathology. Repeat urinalysis was negative for bacteria and blood cultures were negative after 24 hrs with subsequent labs showing normal white blood cell count. Infectious disease was consulted and patient was switched from zosyn to ceftriaxone in the setting of continued thrombocytopenia.     Course complicated by episodes of continued fever, Tmax 101.7 and night sweats. Blood cultures were sent again and were negative after 24 hours with the previous blood cultures now 72 hours with no growth. The patient was hemodynamically stable throughout and admission and continued on home statin. Upon discharge the patient was afebrile for 24 hours and resolution of fever symptoms.     The patient is afebrile, hemodynamically stable and medically optimized for discharge to ___ with follow up with ____. On day of discharge, patient is clinically stable with no new exam findings or acute symptoms compared to prior. The patient was seen by the attending physician on the date of discharge and deemed stable and acceptable for discharge. The patient's chronic medical conditions were treated accordingly per the patient's home medication regimen. The patient's medication reconciliation (with changes made to chronic medications), follow up appointments, discharge orders, instructions, and significant lab and diagnostic studies are as noted.      Important Medication Changes and Reason:  Cipro 500mg bid through 8/12    Active or Pending Issues Requiring Follow-up:  UTI    Advanced Directives:   [ x] Full code  [ ] DNR  [ ] Hospice             HPI:  Patient is a 68 year old male with history of HTN, HLD and Hypothyroid who presents after being called back regarding bacteremia. 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 States that on Thursday 8/1, he woke up drenched in sweat, felt very chilly and started shaking in rigors. States he also felt dizzy at that time. States that when he checked, he had fever of 102F. He was very weak and tired and almost fainted and so family called 911. That day he also had decreased urine output but denies any burning with urination, suprapubic pain, lower back pain. Otherwise has not had any fevers before that day. States that he otherwise did not have any abdominal pain, any right upper quadrant pain.     In the original ED, he had fever of 102.9, elevated lactate, found to have UTI and sent home on oral abx. However he was called back one day later due to positive blood cultures.  (03 Aug 2024 15:33)    Hospital Course:  Patient is a 68 year old male with history of HTN, HLD and Hypothyroid admitted for E. Coli bacteremia. The patient originally presented to the ED 8/2 for malaise, fever, chills, shaking, and intense sweating upon waking. At that time he was found to have a fever of 102 in the ED with positive urine cultures and sent home on oral cefuroxime. He was called back and admitted when his previous blood cultures grew E. Coli.     On labs this admission, leukocytosis was noted along with thrombocytopenia of unclear etiology and mild elevation of INR. Low grade fever in the ED with temps of 102 reported at home. The patient was started on IV broad spectrum antibiotics and sepsis workup was initiated. Right upper quadrant ultrasound, chest Xray, CT abdomen/pelvis did not show any acute pathology. Repeat urinalysis was negative for bacteria and blood cultures were negative after 24 hrs with subsequent labs showing normal white blood cell count. Infectious disease was consulted and patient was switched from zosyn to ceftriaxone in the setting of continued thrombocytopenia.     Course complicated by episodes of continued fever, Tmax 101.7 and night sweats. Blood cultures were sent again and were negative after 24 hours with the previous blood cultures now 72 hours with no growth. The patient was hemodynamically stable throughout and admission and continued on home statin. Upon discharge the patient was afebrile for 24 hours and resolution of all symptoms.     The patient is afebrile, hemodynamically stable and medically optimized for discharge to ___ with follow up with ____. On day of discharge, patient is clinically stable with no new exam findings or acute symptoms compared to prior. The patient was seen by the attending physician on the date of discharge and deemed stable and acceptable for discharge. The patient's chronic medical conditions were treated accordingly per the patient's home medication regimen. The patient's medication reconciliation (with changes made to chronic medications), follow up appointments, discharge orders, instructions, and significant lab and diagnostic studies are as noted.      Important Medication Changes and Reason:  Cipro 500mg bid through 8/12    Active or Pending Issues Requiring Follow-up:  UTI    Advanced Directives:   [ x] Full code  [ ] DNR  [ ] Hospice

## 2024-08-06 NOTE — DISCHARGE NOTE NURSING/CASE MANAGEMENT/SOCIAL WORK - NSDCPNDISPN_GEN_ALL_CORE
Education provided on the pain management plan of care/Side effects of pain management treatment/Activities of daily living, including home environment that might     exacerbate pain or reduce effectiveness of the pain management plan of care as well as strategies to address these issues/Safe use, storage and disposal of opioids when prescribed/Opioids not applicable/not prescribed Pelvic exam deferred.

## 2024-08-06 NOTE — PROGRESS NOTE ADULT - PROBLEM SELECTOR PLAN 4
holding home candesartan 16mg qD

## 2024-08-06 NOTE — DISCHARGE NOTE NURSING/CASE MANAGEMENT/SOCIAL WORK - NSDCPEFALRISK_GEN_ALL_CORE
For information on Fall & Injury Prevention, visit: [email or web address] OR  [email or web address] OR  https://www.cdc.gov/steadi/patient.html

## 2024-08-06 NOTE — DISCHARGE NOTE PROVIDER - NSDCMRMEDTOKEN_GEN_ALL_CORE_FT
candesartan 16 mg oral tablet: 1 tab(s) orally once a day  ciprofloxacin 500 mg oral tablet: 1 tab(s) orally 2 times a day Take through 8/12, start with PM dose 8/6  levothyroxine 100 mcg (0.1 mg) oral tablet: 1 tab(s) orally once a day  rosuvastatin 20 mg oral tablet: 1 tab(s) orally once a day

## 2024-08-06 NOTE — PROGRESS NOTE ADULT - ATTENDING COMMENTS
68M HTN, HLD and Hypothyroid presenting with E. Coli Bacteremia likely from UTI.    Feeling much better today - has no complaints. Last fever was low grade 100.5 yesterday afternoon.     #E Coli bacteremia  - Noted on Bcx 8/2, repeat Bcx negative   - febrile 8/4 - repeat Bcx 8/4 NGTD x 24hrs    - c/w CTX for now, d/c on cipro per ID  - CT A/P and US negative for path     #Thrombocytopenia  -suspect in setting of acute infection  -zosyn may have also been contributory  -improving overall     #HTN-reintroduce home antihtn as able   #HLD-c/w home statin  #hypothyroid-continue synthroid    d/c home today
68M HTN, HLD and Hypothyroid presenting with E. Coli Bacteremia likely from UTI.  Febrile overnight 101.7, low grade elizabeth 100.5 this AM  pt generally feeling much better, denies any localizing symptoms     #E Coli bacteremia  - Noted on Bcx 8/2, repeat Bcx negative   - febrile overnight - repeat Bcx 8/4 pending   - c/w CTX for now, d/c on cipro per ID  - CT A/P and US negative for path   - if has recurrent high grade fever, will check MRCP     #Thrombocytopenia  -suspect in setting of acute infection  -zosyn may have also been contributory  -improving overall     #HTN-reintroduce home antihtn as able   #HLD-c/w home statin  #hypothyroid-continue synthroid
68M  HTN, HLD and Hypothyroid presenting with E. Coli Bacteremia likely from UTI.    #E Coli bacteremia  -Appreciated on cultures 8/2 and now with clear cultures  -transitioned from zosyn to CTX, can be discharged on PO cipro  -Apparent urinary source, CTAP and RUQ US nonrevealing  -appreciate ID recs    #Thrombocytopenia  -suspect in setting of acute infection  -zosyn may have also been contributory  -if continuing to downtrend would prompt workup and potential heme onc consult     #HTN-reintroduce home antihtn as able   #HLD-cw home statin  #hypothyroid-continue synthroid     JENNIFER 1-2d, pending stability of platelets/improvement and transition to PO abx.

## 2024-08-06 NOTE — PROGRESS NOTE ADULT - ASSESSMENT
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 Coli Bacteremia likely from UTI. Will be 24hrs fever free at 2pm on 8/5.     This patient is medically stably, consider for discharge.

## 2024-08-06 NOTE — PROGRESS NOTE ADULT - PROBLEM SELECTOR PLAN 6
diet- regular  dvt ppx- scds until plt are stable and improved  dispo - pending repeat ucx/bcx

## 2024-08-06 NOTE — PROGRESS NOTE ADULT - ASSESSMENT
68 m with HTN, hypothyroidism,  presented on 8/2 for fever, sweating, chills, fatigue, discharged from ED with cefpodoxime but blood cx came back positive with pan S E-coli so pt was called back   tmax: 100.1    no WBC   urine cx with E-coli as well  CT negative    fever, rigors due to E-coli bacteremia 8/2, no urinary symptoms but also in the urine and abd CT negative  still had fever for a few days but repeat blood cx negative 8/3 and 8/5 and completely asymptomatic and no fever now    * on ceftriaxone now, antibiotics started 8/3, now day 4  * on discharge switch to PO cipro 500 bid to complete a 10 day course      The above assessment and plan was discussed with the primary team    Juliana Mendoza MD  contact on teams  After 5pm and on weekends call [phone]

## 2024-08-06 NOTE — PROGRESS NOTE ADULT - SUBJECTIVE AND OBJECTIVE BOX
Follow Up:  bacteremia    Interval History/ROS: no more fever and pt feels well, no cough, vomiting diarrhea or dysuria, repeat blood cx negative            Allergies  No Known Allergies        ANTIMICROBIALS:  cefTRIAXone   IVPB 1000 every 24 hours      OTHER MEDS:  acetaminophen     Tablet .. 650 milliGRAM(s) Oral every 6 hours PRN  atorvastatin 80 milliGRAM(s) Oral at bedtime  levothyroxine 100 MICROGram(s) Oral daily  melatonin 3 milliGRAM(s) Oral at bedtime PRN      Vital Signs Last 24 Hrs  T(C): 37.1 (06 Aug 2024 11:56), Max: 37.3 (06 Aug 2024 00:24)  T(F): 98.7 (06 Aug 2024 11:56), Max: 99.1 (06 Aug 2024 00:24)  HR: 56 (06 Aug 2024 11:56) (56 - 60)  BP: 121/73 (06 Aug 2024 11:56) (121/73 - 136/85)  BP(mean): --  RR: 18 (06 Aug 2024 11:56) (16 - 18)  SpO2: 95% (06 Aug 2024 11:56) (95% - 97%)    Parameters below as of 06 Aug 2024 11:56  Patient On (Oxygen Delivery Method): room air        Physical Exam:  General:    NAD,  non toxic  Respiratory:   comfortable on RA  abd:     soft,    no tenderness  :   no CVAT,  no suprapubic tenderness,   no  pickett  Musculoskeletal:   no joint swelling  vascular: no phlebitis  Skin:    no rash                                   13.4   5.33  )-----------( 86       ( 06 Aug 2024 06:42 )             38.9       08-06    137  |  101  |  13  ----------------------------<  91  4.4   |  24  |  0.99    Ca    8.8      06 Aug 2024 06:37  Phos  3.7     08-06  Mg     1.9     08-06    TPro  5.7<L>  /  Alb  3.3  /  TBili  0.4  /  DBili  x   /  AST  39  /  ALT  49<H>  /  AlkPhos  52  08-06      Urinalysis Basic - ( 06 Aug 2024 06:37 )    Color: x / Appearance: x / SG: x / pH: x  Gluc: 91 mg/dL / Ketone: x  / Bili: x / Urobili: x   Blood: x / Protein: x / Nitrite: x   Leuk Esterase: x / RBC: x / WBC x   Sq Epi: x / Non Sq Epi: x / Bacteria: x        MICROBIOLOGY:  v  .Blood Blood-Peripheral  08-05-24   No growth at 24 hours  --  --      Clean Catch Clean Catch (Midstream)  08-03-24   <10,000 CFU/mL Normal Urogenital Niesha  --  --      .Blood Blood-Peripheral  08-03-24   No growth at 72 Hours  --  --      .Blood Blood-Peripheral  08-03-24   No growth at 72 Hours  --  --      Clean Catch Clean Catch (Midstream)  08-02-24   >100,000 CFU/ml Escherichia coli  --  Escherichia coli      .Blood Blood-Peripheral  08-02-24   Growth in aerobic and anaerobic bottles: Escherichia coli  See previous culture [id number]  --    Growth in aerobic bottle: Gram Negative Rods  Growth in anaerobic bottle: Gram Negative Rods      .Blood Blood-Peripheral  08-02-24   Growth in aerobic and anaerobic bottles: Escherichia coli  Direct identification is available within approximately 3-5  hours either by Blood Panel Multiplexed PCR or Direct  MALDI-TOF. Details: [email or web address]  --  Blood Culture PCR  Escherichia coli                RADIOLOGY:  Images independently visualized and reviewed personally, findings as below  < from: US Abdomen Upper Quadrant Right (08.04.24 @ 11:27) >    IMPRESSION:  Gallstone.    Hepatic cysts and right renal cyst.    < end of copied text >  < from: CT Abdomen and Pelvis w/ IV Cont (08.03.24 @ 19:41) >  IMPRESSION:  No evidence of acute abnormality in the abdomen and pelvis.      < end of copied text >

## 2024-08-06 NOTE — PROGRESS NOTE ADULT - PROBLEM SELECTOR PROBLEM 1
Sepsis due to Escherichia coli

## 2024-08-06 NOTE — DISCHARGE NOTE PROVIDER - NSDCCPTREATMENT_GEN_ALL_CORE_FT
PRINCIPAL PROCEDURE  Procedure: CT abdomen & pelvis  Findings and Treatment: IMPRESSION:  No evidence of acute abnormality in the abdomen and pelvis.  --- End of Report ---        SECONDARY PROCEDURE  Procedure: RUQ US (right upper quadrant ultrasound)  Findings and Treatment: IMPRESSION:  Gallstone.  Hepatic cysts and right renal cyst.  ---End of Report ---

## 2024-08-06 NOTE — DISCHARGE NOTE NURSING/CASE MANAGEMENT/SOCIAL WORK - PATIENT PORTAL LINK FT
You can access the FollowMyHealth Patient Portal offered by [facility] by registering at the following website: [email or web address]. By joining [employer]’s FollowMyHealth portal, you will also be able to view your health information using other applications (apps) compatible with our system.

## 2024-08-06 NOTE — PROGRESS NOTE ADULT - SUBJECTIVE AND OBJECTIVE BOX
PROGRESS NOTE:   Authored by María Veronica MD  Internal Medicine Resident Physician, PGY-1      Patient is a 68y old  Male who presents with a chief complaint of bacteremia (05 Aug 2024 14:41)      SUBJECTIVE / OVERNIGHT EVENTS: ***, patient seen and examined at bedside    MEDICATIONS  (STANDING):  atorvastatin 80 milliGRAM(s) Oral at bedtime  cefTRIAXone   IVPB 1000 milliGRAM(s) IV Intermittent every 24 hours  levothyroxine 100 MICROGram(s) Oral daily    MEDICATIONS  (PRN):  acetaminophen     Tablet .. 650 milliGRAM(s) Oral every 6 hours PRN Temp greater or equal to 38C (100.4F), Moderate Pain (4 - 6)  melatonin 3 milliGRAM(s) Oral at bedtime PRN Insomnia      CAPILLARY BLOOD GLUCOSE        I&O's Summary      PHYSICAL EXAM:  Vital Signs Last 24 Hrs  T(C): 37.3 (06 Aug 2024 04:24), Max: 38.1 (05 Aug 2024 13:41)  T(F): 99.1 (06 Aug 2024 04:24), Max: 100.5 (05 Aug 2024 13:41)  HR: 59 (06 Aug 2024 04:24) (58 - 60)  BP: 132/81 (06 Aug 2024 04:24) (131/80 - 140/80)  BP(mean): --  RR: 18 (06 Aug 2024 04:24) (16 - 18)  SpO2: 96% (06 Aug 2024 04:24) (95% - 96%)    Parameters below as of 06 Aug 2024 04:24  Patient On (Oxygen Delivery Method): room air      CONSTITUTIONAL: Well-groomed, in no apparent distress  RESPIRATORY: Breathing comfortably; no dullness to percussion; lungs CTA without wheeze/rhonchi/rales  CARDIOVASCULAR: +S1S2, RRR, no M/G/R; pedal pulses full and symmetric; no lower extremity edema  GASTROINTESTINAL: No palpable masses or tenderness, +BS throughout, no rebound/guarding; no hepatosplenomegaly; no hernia palpated  SKIN: No rashes or ulcers noted  NEUROLOGIC: A+O x 3, CN II-XII intact; sensation intact in LEs b/l to light touch    LABS:                        14.6   4.38  )-----------( 85       ( 05 Aug 2024 07:18 )             42.9     08-06    137  |  101  |  13  ----------------------------<  91  4.4   |  24  |  0.99    Ca    8.8      06 Aug 2024 06:37  Phos  3.7     08-06  Mg     1.9     08-06    TPro  5.7<L>  /  Alb  3.3  /  TBili  0.4  /  DBili  x   /  AST  39  /  ALT  49<H>  /  AlkPhos  52  08-06          Urinalysis Basic - ( 06 Aug 2024 06:37 )    Color: x / Appearance: x / SG: x / pH: x  Gluc: 91 mg/dL / Ketone: x  / Bili: x / Urobili: x   Blood: x / Protein: x / Nitrite: x   Leuk Esterase: x / RBC: x / WBC x   Sq Epi: x / Non Sq Epi: x / Bacteria: x        Culture - Urine (collected 03 Aug 2024 09:16)  Source: Clean Catch Clean Catch (Midstream)  Final Report (04 Aug 2024 19:04):    <10,000 CFU/mL Normal Urogenital Niesha        RADIOLOGY & ADDITIONAL TESTS:  Results Reviewed:   Imaging Personally Reviewed:  Electrocardiogram Personally Reviewed:

## 2024-08-06 NOTE — PROGRESS NOTE ADULT - PROBLEM SELECTOR PLAN 2
plt initially 140 and then dropped to 90, unclear origin, but may be related to thrombocytopenia of sepsis  - Per ID - change azithro to ceftriaxone  - hold chemical ppx, start SCD at this time  - platelets continue to uptrend

## 2024-08-06 NOTE — PROGRESS NOTE ADULT - PROBLEM SELECTOR PLAN 5
c/w home statin interchange

## 2024-08-08 LAB
CULTURE RESULTS: SIGNIFICANT CHANGE UP
CULTURE RESULTS: SIGNIFICANT CHANGE UP
SPECIMEN SOURCE: SIGNIFICANT CHANGE UP
SPECIMEN SOURCE: SIGNIFICANT CHANGE UP
